# Patient Record
Sex: FEMALE | Race: WHITE | NOT HISPANIC OR LATINO | Employment: FULL TIME | URBAN - METROPOLITAN AREA
[De-identification: names, ages, dates, MRNs, and addresses within clinical notes are randomized per-mention and may not be internally consistent; named-entity substitution may affect disease eponyms.]

---

## 2017-04-12 ENCOUNTER — ALLIED HEALTH/NURSE VISIT (OUTPATIENT)
Dept: NURSING | Facility: CLINIC | Age: 47
End: 2017-04-12
Payer: COMMERCIAL

## 2017-04-12 ENCOUNTER — OFFICE VISIT (OUTPATIENT)
Dept: OBGYN | Facility: CLINIC | Age: 47
End: 2017-04-12
Payer: COMMERCIAL

## 2017-04-12 VITALS
HEIGHT: 64 IN | BODY MASS INDEX: 29.02 KG/M2 | SYSTOLIC BLOOD PRESSURE: 113 MMHG | TEMPERATURE: 98.3 F | DIASTOLIC BLOOD PRESSURE: 73 MMHG | HEART RATE: 86 BPM | WEIGHT: 170 LBS

## 2017-04-12 DIAGNOSIS — Z01.411 ENCOUNTER FOR GYNECOLOGICAL EXAMINATION WITH ABNORMAL FINDING: Primary | ICD-10-CM

## 2017-04-12 DIAGNOSIS — Z23 NEED FOR HEPATITIS B VACCINATION: Primary | ICD-10-CM

## 2017-04-12 DIAGNOSIS — R51.9 SINUS HEADACHE: ICD-10-CM

## 2017-04-12 DIAGNOSIS — Z23 NEED FOR HEPATITIS B VACCINATION: ICD-10-CM

## 2017-04-12 DIAGNOSIS — Z01.419 WELL WOMAN EXAM WITH ROUTINE GYNECOLOGICAL EXAM: ICD-10-CM

## 2017-04-12 PROCEDURE — 90746 HEPB VACCINE 3 DOSE ADULT IM: CPT | Performed by: OBSTETRICS & GYNECOLOGY

## 2017-04-12 PROCEDURE — 99213 OFFICE O/P EST LOW 20 MIN: CPT | Mod: 25 | Performed by: OBSTETRICS & GYNECOLOGY

## 2017-04-12 PROCEDURE — 99207 ZZC NO CHARGE NURSE ONLY: CPT

## 2017-04-12 PROCEDURE — 80061 LIPID PANEL: CPT | Performed by: OBSTETRICS & GYNECOLOGY

## 2017-04-12 PROCEDURE — 36415 COLL VENOUS BLD VENIPUNCTURE: CPT | Performed by: OBSTETRICS & GYNECOLOGY

## 2017-04-12 PROCEDURE — 90471 IMMUNIZATION ADMIN: CPT | Performed by: OBSTETRICS & GYNECOLOGY

## 2017-04-12 PROCEDURE — 99396 PREV VISIT EST AGE 40-64: CPT | Mod: 25 | Performed by: OBSTETRICS & GYNECOLOGY

## 2017-04-12 RX ORDER — LEVONORGESTREL AND ETHINYL ESTRADIOL 0.15-0.03
1 KIT ORAL DAILY
Qty: 84 TABLET | Refills: 3 | Status: SHIPPED | OUTPATIENT
Start: 2017-04-12 | End: 2018-02-07

## 2017-04-12 RX ORDER — AMOXICILLIN 875 MG
875 TABLET ORAL 2 TIMES DAILY
Qty: 20 TABLET | Refills: 0 | Status: SHIPPED | OUTPATIENT
Start: 2017-04-12 | End: 2019-02-18 | Stop reason: ALTCHOICE

## 2017-04-12 NOTE — NURSING NOTE
"Chief Complaint   Patient presents with     Physical       Initial /73  Pulse 86  Temp 98.3  F (36.8  C)  Ht 5' 4.25\" (1.632 m)  Wt 170 lb (77.1 kg)  LMP 02/15/2017  Breastfeeding? No  BMI 28.95 kg/m2 Estimated body mass index is 28.95 kg/(m^2) as calculated from the following:    Height as of this encounter: 5' 4.25\" (1.632 m).    Weight as of this encounter: 170 lb (77.1 kg).  BP completed using cuff size: regular        The following HM Due: NONE      The following patient reported/Care Every where data was sent to:  P ABSTRACT QUALITY INITIATIVES [60165]  none     n/a             "

## 2017-04-12 NOTE — MR AVS SNAPSHOT
"              After Visit Summary   4/12/2017    Morelia Stallworth    MRN: [id number]           Patient Information     Date Of Birth          1970        Visit Information        Provider Department      4/12/2017 1:00 PM Gloria Wynn MD [facility]        Today's Diagnoses     Encounter for gynecological examination with abnormal finding    -  1    Sinus headache        Well woman exam with routine gynecological exam        Need for hepatitis B vaccination           Follow-ups after your visit        Who to contact     If you have questions or need follow up information about today's clinic visit or your schedule please contact [facility] directly at [phone].  Normal or non-critical lab and imaging results will be communicated to you by MyChart, letter or phone within 4 business days after the clinic has received the results. If you do not hear from us within 7 days, please contact the clinic through MyChart or phone. If you have a critical or abnormal lab result, we will notify you by phone as soon as possible.  Submit refill requests through [employer] or call your pharmacy and they will forward the refill request to us. Please allow 3 business days for your refill to be completed.          Additional Information About Your Visit        MyChart Information     [employer] gives you secure access to your electronic health record. If you see a primary care provider, you can also send messages to your care team and make appointments. If you have questions, please call your primary care clinic.  If you do not have a primary care provider, please call [phone] and they will assist you.        Care EveryWhere ID     This is your Care EveryWhere ID. This could be used by other organizations to access your [facility] medical records  [id number]        Your Vitals Were     Pulse Temperature Height Last Period Breastfeeding? BMI (Body Mass Index)    86 98.3  F (36.8  C) 5' 4.25\" " (1.632 m) 02/15/2017 No 28.95 kg/m2       Blood Pressure from Last 3 Encounters:   04/12/17 113/73   01/20/16 100/62   11/19/14 100/60    Weight from Last 3 Encounters:   04/12/17 170 lb (77.1 kg)   01/20/16 168 lb 1.6 oz (76.2 kg)   11/19/14 165 lb (74.8 kg)              We Performed the Following     HEPATITIS B VACCINE, ADULT, IM     Lipid Profile with reflex to direct LDL     VACCINE ADMINISTRATION, INITIAL          Today's Medication Changes          These changes are accurate as of: 4/12/17  2:29 PM.  If you have any questions, ask your nurse or doctor.               Start taking these medicines.        Dose/Directions    amoxicillin 875 MG tablet   Commonly known as:  AMOXIL   Used for:  Sinus headache   Started by:  Gloria Wynn MD        Dose:  875 mg   Take 1 tablet (875 mg) by mouth 2 times daily   Quantity:  20 tablet   Refills:  0            Where to get your medicines      These medications were sent to [facility] - MALIK Bernal - [address]  [address], [place] [address]     Phone:  [phone]     levonorgestrel-ethinyl estradiol 0.15-30 MG-MCG per tablet         These medications were sent to [facility] [address] [place] [address] AT [facility] & [address] [place] [id number]    Hours:  24-hours Phone:  [phone]     amoxicillin 875 MG tablet                Primary Care Provider Office Phone # Fax #    Sánchez Jaswant Wells [phone]       [address] [address]        Thank you!     Thank you for choosing [facility]  for your care. Our goal is always to provide you with excellent care. Hearing back from our patients is one way we can continue to improve our services. Please take a few minutes to complete the written survey that you may receive in the mail after your visit with us. Thank you!             Your Updated  Medication List - Protect others around you: Learn how to safely use, store and throw away your medicines at www.disposemymeds.org.          This list is accurate as of: 4/12/17  2:29 PM.  Always use your most recent med list.                   Brand Name Dispense Instructions for use    amoxicillin 875 MG tablet    AMOXIL    20 tablet    Take 1 tablet (875 mg) by mouth 2 times daily       fexofenadine 180 MG tablet    ALLEGRA    90 tablet    take 1 tablet by mouth daily.       levonorgestrel-ethinyl estradiol 0.15-30 MG-MCG per tablet    NORDETTE    84 tablet    Take 1 tablet by mouth daily Pt will need an office visit for more refills. Thanks!

## 2017-04-12 NOTE — PROGRESS NOTES
"Morelia is a 46 year old  female who presents for annual exam.  She is doing well with current oral contraceptive pills, takes them continuously x 3 months.  No risk factors, wishes to continue pills.  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  Had mammogram in , will schedule another.  Normal pap 2016, will do q 3 year.  Sister told her she had elevated lipids, will send lipid panel today.  Needs last Hep B vaccination, will get today.  In addition to routine health maintenance, she also wishes to discuss a persistent headache.  15 minutes were spent in counseling regarding this issue (in addition to routine health maintenance.)   She describes a frontal and orbital \"raccoon eyes\" headache present for the past few weeks.  Advil/tylenol help, but the headache recurs.  No history of migraines.  No visual disturbances, no peripheral symptoms.  No nasal drainage, watery/itchy eyes.  Discussed with Dr. Wells (he sees her children, she has seen him in the past) who advises this is likely a sinus headache, and advises rx with amoxicillin as written.  If this is not helpful, she will RTC with Dr. Wells.        Menses are every 3 months and normal lasting 3 days.  Menses flow: normal and light.  Patient's last menstrual period was 02/15/2017.. Using oral contraceptives for contraception.  She is not currently considering pregnancy.  Besides routine health maintenance, she has no other health concerns today .  GYNECOLOGIC HISTORY:  Menarche: 15    Morelia is sexually active with 1male partner(s) and is currently in monogamous relationship with .    History sexually transmitted infections:No STD history  STI testing offered?  Declined  JAY JAY exposure: No  History of abnormal Pap smear: NO - age 30- 65 PAP every 3 years recommended  Family history of breast CA: No  Family history of uterine/ovarian CA: No    Family history of colon CA: No    HEALTH MAINTENANCE:  Cholesterol: (  Cholesterol "   Date Value Ref Range Status   2014 199 <200 mg/dL Final     Comment:     LDL Cholesterol is the primary guide to therapy.   The NCEP recommends further evaluation of: patients with cholesterol greater   than 200 mg/dL if additional risk factors are present, cholesterol greater   than   240 mg/dL, triglycerides greater than 150 mg/dL, or HDL less than 40 mg/dL.     2010 160 0 - 200 mg/dL Final     Comment:     LDL Cholesterol is the primary guide to therapy.   The NCEP recommends further evaluation of: patients with cholesterol <200   mg/dL   if additional risk factors are present, cholesterol >240 mg/dL, triglycerides   >150 mg/dL, or HDL <40 mg/dL.    History of abnormal lipids: slightly high  Mammo:  . History of abnormal Mammo: No.  Regular Self Breast Exams: Yes  Calcium/Vitamin D intake: source:  dairy, dietary supplement(s) Adequate? Yes  TSH: (  TSH   Date Value Ref Range Status   2013 5.89 (H) 0.4 - 5.0 mU/L Final    )  Pap; (  Lab Results   Component Value Date    PAP NIL 2016    PAP NIL 2013    PAP NIL 2011    )    HISTORY:  Obstetric History       T2      TAB0   SAB0   E0   M0   L2       # Outcome Date GA Lbr Hubert/2nd Weight Sex Delivery Anes PTL Lv   2 Term 05 40w0d  9 lb 10 oz (4.366 kg) F CS   Y      Name: Xena   1 Term 03 40w0d  9 lb (4.082 kg) F CS   Y      Name: chris        Past Medical History:   Diagnosis Date     Endometriosis, site unspecified     lap      Past Surgical History:   Procedure Laterality Date     C  DELIVERY ONLY  3/4/03     C  DELIVERY ONLY  05     LAPAROSCOPY PROCEDURE UNLISTED  02    endometriosis     Family History   Problem Relation Age of Onset     CANCER Maternal Grandfather      pancreatic     Lipids Mother      Lipids Father      Thyroid Disease Mother      hypo     Neurologic Disorder Father      frequent HAs     Depression Mother      Depression Maternal Aunt       Depression Other      maternal cousins     Breast Cancer Maternal Aunt      Social History     Social History     Marital status:      Spouse name: N/A     Number of children: N/A     Years of education: N/A     Social History Main Topics     Smoking status: Never Smoker     Smokeless tobacco: Never Used     Alcohol use 0.6 oz/week     1 Standard drinks or equivalent per week      Comment: occ     Drug use: No     Sexual activity: Yes     Partners: Male     Birth control/ protection: Pill     Other Topics Concern      Service No     Blood Transfusions No     Caffeine Concern No     Occupational Exposure No     Hobby Hazards No     Sleep Concern No     Stress Concern No     Weight Concern No     Special Diet No     Back Care No     Exercise Yes     walks daily     Bike Helmet Yes     Seat Belt Yes     Self-Exams No     Social History Narrative    Caffeine intake/servings daily - 2-3    Calcium intake/servings daily - 2    Exercise 7 times weekly - describe jogging dance    Sunscreen used - Yes    Seatbelts used - Yes    Guns stored in the home - No    Self Breast Exam - No    Pap test up to date -  Yes    Eye exam up to date -  Yes    Dental exam up to date -  Yes    DEXA scan up to date -  Not Applicable    Flex Sig/Colonoscopy up to date -  Not Applicable    Mammography up to date -  Not Applicable    Immunizations reviewed and up to date - Yes    Abuse: Current or Past (Physical, Sexual or Emotional) - No    Do you feel safe in your environment - Yes    Do you cope well with stress - Yes    Do you suffer from insomnia - occ    Last updated by: Shefali Faulkner MA September 30, 2011                                           Current Outpatient Prescriptions:      levonorgestrel-ethinyl estradiol (NORDETTE) 0.15-30 MG-MCG per tablet, Take 1 tablet by mouth daily Pt will need an office visit for more refills. Thanks!, Disp: 84 tablet, Rfl: 0     fexofenadine (ALLEGRA) 180 MG tablet, take 1 tablet by mouth  "daily., Disp: 90 tablet, Rfl: 3     Allergies   Allergen Reactions     No Known Drug Allergies        Past medical, surgical, social and family history were reviewed and updated in EPIC.    ROS:   C:     NEGATIVE for fever, chills, change in weight  I:       NEGATIVE for worrisome rashes, moles or lesions  E:     NEGATIVE for vision changes or irritation  E/M: NEGATIVE for ear, mouth and throat problems  R:     NEGATIVE for significant cough or SOB  CV:   NEGATIVE for chest pain, palpitations or peripheral edema  GI:     NEGATIVE for nausea, abdominal pain, heartburn, or change in bowel habits  :   NEGATIVE for frequency, dysuria, hematuria, vaginal discharge, or irregular bleeding  M:     NEGATIVE for significant arthralgias or myalgia  N:      NEGATIVE for weakness, dizziness or paresthesias  E:      NEGATIVE for temperature intolerance, skin/hair changes  P:      NEGATIVE for changes in mood or affect.    EXAM:  /73  Pulse 86  Temp 98.3  F (36.8  C)  Ht 5' 4.25\" (1.632 m)  Wt 170 lb (77.1 kg)  LMP 02/15/2017  Breastfeeding? No  BMI 28.95 kg/m2   BMI: Body mass index is 28.95 kg/(m^2).  Constitutional: healthy, alert and no distress  Head: Normocephalic. No masses, lesions, tenderness or abnormalities  Neck: Neck supple. Trachea midline. No adenopathy. Thyroid symmetric, normal size.   Cardiovascular: RRR.   Respiratory: Negative.   Breast: No nodularity, asymmetry or nipple discharge bilaterally.  Gastrointestinal: Abdomen soft, non-tender, non-distended. No masses, organomegaly.  :  Vulva:  No external lesions, normal female hair distribution, no inguinal adenopathy.    Urethra:  Midline, non-tender, well supported, no discharge  Vagina:  Moist, pink, no abnormal discharge, no lesions  Uterus:  Normal size, non-tender, freely mobile  Ovaries:  No masses appreciated, non-tender, mobile  Rectal Exam: deferred  Musculoskeletal: extremities normal  Skin: no suspicious lesions or rashes  Psychiatric: " Affect appropriate, cooperative,mentation appears normal.     COUNSELING:      reports that she has never smoked. She has never used smokeless tobacco.    Body mass index is 28.95 kg/(m^2).  Weight management plan: diet and exercise  FRAX Risk Assessment    ASSESSMENT:  46 year old female with satisfactory annual exam  (Z01.411) Encounter for gynecological examination with abnormal finding  (primary encounter diagnosis)  Comment:   Plan: Lipid Profile with reflex to direct LDL            (R51) Sinus headache  Comment:   Plan: amoxicillin (AMOXIL) 875 MG tablet            (Z01.419) Well woman exam with routine gynecological exam  Comment:   Plan: levonorgestrel-ethinyl estradiol (NORDETTE)         0.15-30 MG-MCG per tablet            (Z23) Need for hepatitis B vaccination  Comment:   Plan: HEPATITIS B VACCINE, ADULT, IM, VACCINE         ADMINISTRATION, INITIAL

## 2017-04-13 LAB
CHOLEST SERPL-MCNC: 213 MG/DL
HDLC SERPL-MCNC: 59 MG/DL
LDLC SERPL CALC-MCNC: 128 MG/DL
NONHDLC SERPL-MCNC: 154 MG/DL
TRIGL SERPL-MCNC: 128 MG/DL

## 2017-09-13 ENCOUNTER — ALLIED HEALTH/NURSE VISIT (OUTPATIENT)
Dept: NURSING | Facility: CLINIC | Age: 47
End: 2017-09-13
Payer: COMMERCIAL

## 2017-09-13 DIAGNOSIS — Z23 NEED FOR PROPHYLACTIC VACCINATION AND INOCULATION AGAINST INFLUENZA: Primary | ICD-10-CM

## 2017-09-13 DIAGNOSIS — Z23 NEED FOR TUBERCULOSIS VACCINATION: ICD-10-CM

## 2017-09-13 PROCEDURE — 90471 IMMUNIZATION ADMIN: CPT

## 2017-09-13 PROCEDURE — 99207 ZZC NO CHARGE NURSE ONLY: CPT

## 2017-09-13 PROCEDURE — 90686 IIV4 VACC NO PRSV 0.5 ML IM: CPT

## 2017-09-13 PROCEDURE — 86580 TB INTRADERMAL TEST: CPT

## 2017-09-13 NOTE — MR AVS SNAPSHOT
After Visit Summary   9/13/2017    Morelia Stallworth    MRN: [id number]           Patient Information     Date Of Birth          1970        Visit Information        Provider Department      9/13/2017 9:00 AM HW MEDICAL ASSISTANT [facility]        Today's Diagnoses     Need for prophylactic vaccination and inoculation against influenza    -  1    Need for tuberculosis vaccination           Follow-ups after your visit        Your next 10 appointments already scheduled     Sep 15, 2017  9:30 AM CDT   Nurse Only with HW RN/TRIAGE NURSE ONLY   [facility] ([facility])    [address] [place] [id number] [phone]              Who to contact     If you have questions or need follow up information about today's clinic visit or your schedule please contact [facility] directly at [phone].  Normal or non-critical lab and imaging results will be communicated to you by [employer], letter or phone within 4 business days after the clinic has received the results. If you do not hear from us within 7 days, please contact the clinic through [employer] or phone. If you have a critical or abnormal lab result, we will notify you by phone as soon as possible.  Submit refill requests through [employer] or call your pharmacy and they will forward the refill request to us. Please allow 3 business days for your refill to be completed.          Additional Information About Your Visit        MyChart Information     [employer] gives you secure access to your electronic health record. If you see a primary care provider, you can also send messages to your care team and make appointments. If you have questions, please call your primary care clinic.  If you do not have a primary care provider, please call [phone] and they will assist you.        Care EveryWhere ID     This is your Care EveryWhere ID. This could be used by other organizations  to access your [facility] medical records  [id number]         Blood Pressure from Last 3 Encounters:   04/12/17 113/73   01/20/16 100/62   11/19/14 100/60    Weight from Last 3 Encounters:   04/12/17 170 lb (77.1 kg)   01/20/16 168 lb 1.6 oz (76.2 kg)   11/19/14 165 lb (74.8 kg)              We Performed the Following     EA ADD'L VACCINE     FLU VAC, SPLIT VIRUS IM > 3 YO (QUADRIVALENT) [70097]     TB INTRADERMAL TEST     Vaccine Administration, Initial [38455]        Primary Care Provider Office Phone # Fax #    Sánchez Jaswant Wells [phone] [address] [place], [place] [id number]        Equal Access to Services     YAEL DUMONT : Hadii aad ku hadasho Soomaali, waaxda luqadaha, qaybta kaalmada adeegyada, quique crane . So [facility] [phone].    ATENCIÓN: Si habla español, tiene a nuñez disposición servicios gratuitos de asistencia lingüística. Llame al [phone].    We comply with applicable federal civil rights laws and [place] laws. We do not discriminate on the basis of race, color, national origin, age, disability sex, sexual orientation or gender identity.            Thank you!     Thank you for choosing [facility]  for your care. Our goal is always to provide you with excellent care. Hearing back from our patients is one way we can continue to improve our services. Please take a few minutes to complete the written survey that you may receive in the mail after your visit with us. Thank you!             Your Updated Medication List - Protect others around you: Learn how to safely use, store and throw away your medicines at www.disposemymeds.org.          This list is accurate as of: 9/13/17  9:46 AM.  Always use your most recent med list.                   Brand Name Dispense Instructions for use Diagnosis    amoxicillin 875 MG tablet    AMOXIL    20 tablet    Take 1 tablet (875 mg) by mouth 2 times daily    Sinus headache        fexofenadine 180 MG tablet    ALLEGRA    90 tablet    take 1 tablet by mouth daily.    Allergies       levonorgestrel-ethinyl estradiol 0.15-30 MG-MCG per tablet    NORDETTE    84 tablet    Take 1 tablet by mouth daily Pt will need an office visit for more refills. Thanks!    Well woman exam with routine gynecological exam

## 2017-09-13 NOTE — PROGRESS NOTES
Injectable Influenza Immunization Documentation    1.  Are you sick today? (Fever of 100.5 or higher on the day of the clinic)   No    2.  Have you ever had Guillain-Vanzant Syndrome within 6 weeks of an influenza vaccionation?  No    3. Do you have a life-threatening allergy to eggs?  No    4. Do you have a life-threatening allergy to a component of the vaccine? May include antibiotics, gelatin or latex.  No     5. Have you ever had a reaction to a dose of flu vaccine that needed immediate medical attention?  No     Form completed by Natacha Geller MA

## 2017-09-13 NOTE — NURSING NOTE
The patient is asked the following questions today and these are her answers:    -Have you had a mantoux administered in the past 30 days?    No  -Have you had a previous positive Mantoux.  No  -Have you received BCG in the past.  No  -Have you had a live vaccine  (MMR, Varicella, OPV, Yellow Fever) in the last 6 weeks.  No  -Have you had and active  viral or bacterial infection in the past 6 weeks.  No  -Have you received corticosteroids or immunosuppressive agents in the past 6 weeks.  No  -Have you been diagnosed with HIV?  No  -Do you have a maglinancy?  No     Natacha Geller MA

## 2017-09-13 NOTE — NURSING NOTE
"Chief Complaint   Patient presents with     Allied Health Visit       Initial There were no vitals taken for this visit. Estimated body mass index is 28.95 kg/(m^2) as calculated from the following:    Height as of 4/12/17: 5' 4.25\" (1.632 m).    Weight as of 4/12/17: 170 lb (77.1 kg).  Medication Reconciliation: unable or not appropriate to perform     Natacha Geller MA    - Advise to come back with 448-72 hours for mantoux reading    "

## 2017-09-15 ENCOUNTER — ALLIED HEALTH/NURSE VISIT (OUTPATIENT)
Dept: NURSING | Facility: CLINIC | Age: 47
End: 2017-09-15
Payer: COMMERCIAL

## 2017-09-15 DIAGNOSIS — Z11.1 SCREENING EXAMINATION FOR PULMONARY TUBERCULOSIS: Primary | ICD-10-CM

## 2017-09-15 LAB
PPDINDURATION: 0 MM (ref 0–5)
PPDREDNESS: 0 MM

## 2017-09-15 PROCEDURE — 99207 ZZC NO CHARGE NURSE ONLY: CPT

## 2018-02-09 ENCOUNTER — RADIANT APPOINTMENT (OUTPATIENT)
Dept: MAMMOGRAPHY | Facility: CLINIC | Age: 48
End: 2018-02-09
Attending: FAMILY MEDICINE

## 2018-02-09 DIAGNOSIS — Z12.31 VISIT FOR SCREENING MAMMOGRAM: ICD-10-CM

## 2018-04-18 ENCOUNTER — OFFICE VISIT (OUTPATIENT)
Dept: OBGYN | Facility: CLINIC | Age: 48
End: 2018-04-18
Payer: COMMERCIAL

## 2018-04-18 VITALS
SYSTOLIC BLOOD PRESSURE: 100 MMHG | DIASTOLIC BLOOD PRESSURE: 66 MMHG | HEIGHT: 64 IN | WEIGHT: 175.5 LBS | HEART RATE: 80 BPM | BODY MASS INDEX: 29.96 KG/M2

## 2018-04-18 DIAGNOSIS — E78.00 ELEVATED CHOLESTEROL: ICD-10-CM

## 2018-04-18 DIAGNOSIS — Z01.419 WELL WOMAN EXAM WITH ROUTINE GYNECOLOGICAL EXAM: ICD-10-CM

## 2018-04-18 DIAGNOSIS — Z01.419 ENCOUNTER FOR GYNECOLOGICAL EXAMINATION WITHOUT ABNORMAL FINDING: Primary | ICD-10-CM

## 2018-04-18 PROCEDURE — 80061 LIPID PANEL: CPT | Performed by: OBSTETRICS & GYNECOLOGY

## 2018-04-18 PROCEDURE — 36415 COLL VENOUS BLD VENIPUNCTURE: CPT | Performed by: OBSTETRICS & GYNECOLOGY

## 2018-04-18 PROCEDURE — 99396 PREV VISIT EST AGE 40-64: CPT | Performed by: OBSTETRICS & GYNECOLOGY

## 2018-04-18 RX ORDER — LEVONORGESTREL AND ETHINYL ESTRADIOL 0.15-0.03
1 KIT ORAL DAILY
Qty: 84 TABLET | Refills: 4 | Status: SHIPPED | OUTPATIENT
Start: 2018-04-18 | End: 2018-12-03

## 2018-04-18 NOTE — NURSING NOTE
"Chief Complaint   Patient presents with     Physical       Initial /66  Ht 5' 4.25\" (1.632 m)  Wt 175 lb 8 oz (79.6 kg)  LMP 2018  Breastfeeding? No  BMI 29.89 kg/m2 Estimated body mass index is 29.89 kg/(m^2) as calculated from the following:    Height as of this encounter: 5' 4.25\" (1.632 m).    Weight as of this encounter: 175 lb 8 oz (79.6 kg).  BP completed using cuff size: large        The following HM Due: NONE      The following patient reported/Care Every where data was sent to:  P ABSTRACT QUALITY INITIATIVES [89576]  none      n/a              "

## 2018-04-18 NOTE — MR AVS SNAPSHOT
"              After Visit Summary   4/18/2018    Morelia Stallworth    MRN: [id number]           Patient Information     Date Of Birth          1970        Visit Information        Provider Department      4/18/2018 9:30 AM Gloria Wynn MD [facility]        Today's Diagnoses     Encounter for gynecological examination without abnormal finding    -  1    Well woman exam with routine gynecological exam        Elevated cholesterol           Follow-ups after your visit        Who to contact     If you have questions or need follow up information about today's clinic visit or your schedule please contact [facility] directly at [phone].  Normal or non-critical lab and imaging results will be communicated to you by MyChart, letter or phone within 4 business days after the clinic has received the results. If you do not hear from us within 7 days, please contact the clinic through Mx Orthopedicshart or phone. If you have a critical or abnormal lab result, we will notify you by phone as soon as possible.  Submit refill requests through Lootsie or call your pharmacy and they will forward the refill request to us. Please allow 3 business days for your refill to be completed.          Additional Information About Your Visit        MyChart Information     Lootsie gives you secure access to your electronic health record. If you see a primary care provider, you can also send messages to your care team and make appointments. If you have questions, please call your primary care clinic.  If you do not have a primary care provider, please call [phone] and they will assist you.        Care EveryWhere ID     This is your Care EveryWhere ID. This could be used by other organizations to access your [place] medical records  [id number]        Your Vitals Were     Pulse Height Last Period Breastfeeding? BMI (Body Mass Index)       80 5' 4.25\" (1.632 m) 03/05/2018 No 29.89 kg/m2        Blood Pressure " from Last 3 Encounters:   04/18/18 100/66   04/12/17 113/73   01/20/16 100/62    Weight from Last 3 Encounters:   04/18/18 175 lb 8 oz (79.6 kg)   04/12/17 170 lb (77.1 kg)   01/20/16 168 lb 1.6 oz (76.2 kg)              We Performed the Following     Lipid Profile (Chol, Trig, HDL, LDL calc)          Today's Medication Changes          These changes are accurate as of 4/18/18  9:57 AM.  If you have any questions, ask your nurse or doctor.               These medicines have changed or have updated prescriptions.        Dose/Directions    levonorgestrel-ethinyl estradiol 0.15-30 MG-MCG per tablet   Commonly known as:  GUILLE   This may have changed:  additional instructions   Used for:  Well woman exam with routine gynecological exam   Changed by:  Gloria Wynn MD        Dose:  1 tablet   Take 1 tablet by mouth daily Take hormone pills continuously x 3 months thn off one week   Quantity:  84 tablet   Refills:  4            Where to get your medicines      These medications were sent to [facility] - [place], [place] - [address]  [address], [place] [address]     Phone:  [phone]     levonorgestrel-ethinyl estradiol 0.15-30 MG-MCG per tablet                Primary Care Provider Office Phone # Fax #    Sánchez Jaswant Wells [phone] [address] [place] [address]        Equal Access to Services     GUSTAVO DUMONT AH: Hadii amelia gonzalezo Sobennett, waaxda luqadaha, qaybta kaalmada adeegyasolitario, waxay marielos crane . So [facility] [phone].    ATENCIÓN: Si habla español, tiene a nuñez disposición servicios gratuitos de asistencia lingüística. Llame al [phone].    We comply with applicable federal civil rights laws and [place] laws. We do not discriminate on the basis of race, color, national origin, age, disability, sex, sexual orientation, or gender identity.            Thank you!     Thank you for choosing [facility]  for your care. Our goal is always to provide you with excellent care. Hearing back from our patients is one way we can continue to improve our services. Please take a few minutes to complete the written survey that you may receive in the mail after your visit with us. Thank you!             Your Updated Medication List - Protect others around you: Learn how to safely use, store and throw away your medicines at www.disposemymeds.org.          This list is accurate as of 4/18/18  9:57 AM.  Always use your most recent med list.                   Brand Name Dispense Instructions for use Diagnosis    amoxicillin 875 MG tablet    AMOXIL    20 tablet    Take 1 tablet (875 mg) by mouth 2 times daily    Sinus headache       fexofenadine 180 MG tablet    ALLEGRA    90 tablet    take 1 tablet by mouth daily.    Allergies       levonorgestrel-ethinyl estradiol 0.15-30 MG-MCG per tablet    NORDETTE    84 tablet    Take 1 tablet by mouth daily Take hormone pills continuously x 3 months thn off one week    Well woman exam with routine gynecological exam

## 2018-04-18 NOTE — PROGRESS NOTES
Morelia is a 47 year old  female who presents for annual exam.   She is doing well with oral contraceptive pills, takes them continuously, wishes to continue.  No risk factors.  Cholesterol has been borderline elevated, has FH, will send lipid panel today.  She will continue to try to reduce fat in diet, increase exercise.  Mammogram 2018, plans q 2 year for now, discussed.  Pap due next year.  Dr. Wells is primary, sees him if problems arise.     Menses are regular q 12 weeks and normal lasting 4 days.  Menses flow: normal and light.  Patient's last menstrual period was 2018.. Using oral contraceptives for contraception.  She is not currently considering pregnancy.  Besides routine health maintenance, she has no other health concerns today .  GYNECOLOGIC HISTORY:  Menarche: 0    Morelia is sexually active with 1  male partner(s) and is currently in monogamous relationship with .    History sexually transmitted infections:No STD history  STI testing offered?  Declined  JAY JAY exposure: No  History of abnormal Pap smear: NO - age 30- 65 PAP every 3 years recommended  Family history of breast CA: No  Family history of uterine/ovarian CA: No    Family history of colon CA: No    HEALTH MAINTENANCE:  Cholesterol: (  Cholesterol   Date Value Ref Range Status   2017 213 (H) <200 mg/dL Final     Comment:     Desirable:       <200 mg/dl   2014 199 <200 mg/dL Final     Comment:     LDL Cholesterol is the primary guide to therapy.   The NCEP recommends further evaluation of: patients with cholesterol greater   than 200 mg/dL if additional risk factors are present, cholesterol greater   than   240 mg/dL, triglycerides greater than 150 mg/dL, or HDL less than 40 mg/dL.      History of abnormal lipids borderline  Mammo: 0 . History of abnormal Mammo: Not applicable.  Regular Self Breast Exams: Yes  Calcium/Vitamin D intake: source:  dairy, dietary supplement(s) Adequate? Yes  TSH: (  TSH   Date Value  Ref Range Status   2013 5.89 (H) 0.4 - 5.0 mU/L Final    )  Pap; (  Lab Results   Component Value Date    PAP NIL 2016    PAP NIL 2013    PAP NIL 2011    )    HISTORY:  Obstetric History       T2      L2     SAB0   TAB0   Ectopic0   Multiple0   Live Births2       # Outcome Date GA Lbr Hubert/2nd Weight Sex Delivery Anes PTL Lv   2 Term 05 40w0d  9 lb 10 oz (4.366 kg) F CS   LON      Name: Xena   1 Term 03 40w0d  9 lb (4.082 kg) F CS   LON      Name: chris        Past Medical History:   Diagnosis Date     Endometriosis, site unspecified     lap      Past Surgical History:   Procedure Laterality Date     C  DELIVERY ONLY  3/4/03     C  DELIVERY ONLY  05     LAPAROSCOPY PROCEDURE UNLISTED  02    endometriosis     Family History   Problem Relation Age of Onset     CANCER Maternal Grandfather      pancreatic     Lipids Mother      Lipids Father      Thyroid Disease Mother      hypo     Neurologic Disorder Father      frequent HAs     Depression Mother      Depression Maternal Aunt      Depression Other      maternal cousins     Breast Cancer Maternal Aunt      Social History     Social History     Marital status:      Spouse name: N/A     Number of children: N/A     Years of education: N/A     Social History Main Topics     Smoking status: Never Smoker     Smokeless tobacco: Never Used     Alcohol use 0.6 oz/week     1 Standard drinks or equivalent per week      Comment: occ     Drug use: No     Sexual activity: Yes     Partners: Male     Birth control/ protection: Pill     Other Topics Concern      Service No     Blood Transfusions No     Caffeine Concern No     Occupational Exposure No     Hobby Hazards No     Sleep Concern No     Stress Concern No     Weight Concern No     Special Diet No     Back Care No     Exercise Yes     walks daily     Bike Helmet Yes     Seat Belt Yes     Self-Exams No     Social History Narrative     Caffeine intake/servings daily - 2-3    Calcium intake/servings daily - 2    Exercise 7 times weekly - describe jogging dance    Sunscreen used - Yes    Seatbelts used - Yes    Guns stored in the home - No    Self Breast Exam - No    Pap test up to date -  Yes    Eye exam up to date -  Yes    Dental exam up to date -  Yes    DEXA scan up to date -  Not Applicable    Flex Sig/Colonoscopy up to date -  Not Applicable    Mammography up to date -  Not Applicable    Immunizations reviewed and up to date - Yes    Abuse: Current or Past (Physical, Sexual or Emotional) - No    Do you feel safe in your environment - Yes    Do you cope well with stress - Yes    Do you suffer from insomnia - occ    Last updated by: Shefali Faulkner MA September 30, 2011                                           Current Outpatient Prescriptions:      fexofenadine (ALLEGRA) 180 MG tablet, take 1 tablet by mouth daily., Disp: 90 tablet, Rfl: 3     levonorgestrel-ethinyl estradiol (NORDETTE) 0.15-30 MG-MCG per tablet, Take 1 tablet by mouth daily Pt will need an office visit for more refills. Thanks!, Disp: 84 tablet, Rfl: 1     amoxicillin (AMOXIL) 875 MG tablet, Take 1 tablet (875 mg) by mouth 2 times daily (Patient not taking: Reported on 4/18/2018), Disp: 20 tablet, Rfl: 0     Allergies   Allergen Reactions     No Known Drug Allergies        Past medical, surgical, social and family history were reviewed and updated in EPIC.    ROS:   C:     NEGATIVE for fever, chills, change in weight  I:       NEGATIVE for worrisome rashes, moles or lesions  E:     NEGATIVE for vision changes or irritation  E/M: NEGATIVE for ear, mouth and throat problems  R:     NEGATIVE for significant cough or SOB  CV:   NEGATIVE for chest pain, palpitations or peripheral edema  GI:     NEGATIVE for nausea, abdominal pain, heartburn, or change in bowel habits  :   NEGATIVE for frequency, dysuria, hematuria, vaginal discharge, or irregular bleeding  M:     NEGATIVE for  "significant arthralgias or myalgia  N:      NEGATIVE for weakness, dizziness or paresthesias  E:      NEGATIVE for temperature intolerance, skin/hair changes  P:      NEGATIVE for changes in mood or affect.    EXAM:  /66  Ht 5' 4.25\" (1.632 m)  Wt 175 lb 8 oz (79.6 kg)  LMP 03/05/2018  Breastfeeding? No  BMI 29.89 kg/m2   BMI: Body mass index is 29.89 kg/(m^2).  Constitutional: healthy, alert and no distress  Head: Normocephalic. No masses, lesions, tenderness or abnormalities  Neck: Neck supple. Trachea midline. No adenopathy. Thyroid symmetric, normal size.   Cardiovascular: RRR.   Respiratory: Negative.   Breast: No nodularity, asymmetry or nipple discharge bilaterally.  Gastrointestinal: Abdomen soft, non-tender, non-distended. No masses, organomegaly.  :  Vulva:  No external lesions, normal female hair distribution, no inguinal adenopathy.    Urethra:  Midline, non-tender, well supported, no discharge  Vagina:  Moist, pink, no abnormal discharge, no lesions  Uterus:  Normal size, non-tender, freely mobile  Ovaries:  No masses appreciated, non-tender, mobile  Rectal Exam: deferred  Musculoskeletal: extremities normal  Skin: no suspicious lesions or rashes  Psychiatric: Affect appropriate, cooperative,mentation appears normal.     COUNSELING:   Reviewed preventive health counseling, as reflected in patient instructions       Contraception   reports that she has never smoked. She has never used smokeless tobacco.    Body mass index is 29.89 kg/(m^2).  Weight management plan: diet and exercise  FRAX Risk Assessment    ASSESSMENT:  47 year old female with satisfactory annual exam  (Z01.419) Encounter for gynecological examination without abnormal finding  (primary encounter diagnosis)  Comment:   Plan:     (Z01.419) Well woman exam with routine gynecological exam  Comment:   Plan: levonorgestrel-ethinyl estradiol (NORDETTE)         0.15-30 MG-MCG per tablet            (E78.00) Elevated " cholesterol  Comment:   Plan: Lipid Profile (Chol, Trig, HDL, LDL calc)

## 2018-04-19 LAB
CHOLEST SERPL-MCNC: 213 MG/DL
HDLC SERPL-MCNC: 52 MG/DL
LDLC SERPL CALC-MCNC: 135 MG/DL
NONHDLC SERPL-MCNC: 161 MG/DL
TRIGL SERPL-MCNC: 130 MG/DL

## 2018-08-06 ENCOUNTER — ALLIED HEALTH/NURSE VISIT (OUTPATIENT)
Dept: NURSING | Facility: CLINIC | Age: 48
End: 2018-08-06
Payer: COMMERCIAL

## 2018-08-06 DIAGNOSIS — Z11.1 SCREENING EXAMINATION FOR PULMONARY TUBERCULOSIS: Primary | ICD-10-CM

## 2018-08-06 PROCEDURE — 99207 ZZC NO CHARGE NURSE ONLY: CPT

## 2018-08-06 PROCEDURE — 86580 TB INTRADERMAL TEST: CPT

## 2018-08-06 NOTE — MR AVS SNAPSHOT
After Visit Summary   8/6/2018    Morelia Stallworth    MRN: [id number]           Patient Information     Date Of Birth          1970        Visit Information        Provider Department      8/6/2018 3:00 PM HW MEDICAL ASSISTANT [facility]        Today's Diagnoses     Screening examination for pulmonary tuberculosis    -  1       Follow-ups after your visit        Your next 10 appointments already scheduled     Aug 08, 2018  4:00 PM CDT   Nurse Only with HW RN/TRIAGE NURSE ONLY   [facility] ([facility])    [address] [place] [id number] [phone]              Who to contact     If you have questions or need follow up information about today's clinic visit or your schedule please contact [facility] directly at [phone].  Normal or non-critical lab and imaging results will be communicated to you by babbelhart, letter or phone within 4 business days after the clinic has received the results. If you do not hear from us within 7 days, please contact the clinic through babbelhart or phone. If you have a critical or abnormal lab result, we will notify you by phone as soon as possible.  Submit refill requests through MentorDOTMe or call your pharmacy and they will forward the refill request to us. Please allow 3 business days for your refill to be completed.          Additional Information About Your Visit        MyChart Information     MentorDOTMe gives you secure access to your electronic health record. If you see a primary care provider, you can also send messages to your care team and make appointments. If you have questions, please call your primary care clinic.  If you do not have a primary care provider, please call [phone] and they will assist you.        Care EveryWhere ID     This is your Care EveryWhere ID. This could be used by other organizations to access your [place] medical records  [id number]          Blood Pressure from Last 3 Encounters:   04/18/18 100/66   04/12/17 113/73   01/20/16 100/62    Weight from Last 3 Encounters:   04/18/18 175 lb 8 oz (79.6 kg)   04/12/17 170 lb (77.1 kg)   01/20/16 168 lb 1.6 oz (76.2 kg)              We Performed the Following     ADMIN 1st VACCINE     TB INTRADERMAL TEST        Primary Care Provider Office Phone # Fax #    Sánchez Jaswant Wells [phone] [address] [place] [address]        Equal Access to Services     [facility]: Hadii amelia Knight, waronald garcia, azucena cabralesmasolitario el, quique crane . So [facility] [phone].    ATENCIÓN: Si habla español, tiene a nuñez disposición servicios gratuitos de asistencia lingüística. [facility] [phone].    We comply with applicable federal civil rights laws and [place] laws. We do not discriminate on the basis of race, color, national origin, age, disability, sex, sexual orientation, or gender identity.            Thank you!     Thank you for choosing [facility]  for your care. Our goal is always to provide you with excellent care. Hearing back from our patients is one way we can continue to improve our services. Please take a few minutes to complete the written survey that you may receive in the mail after your visit with us. Thank you!             Your Updated Medication List - Protect others around you: Learn how to safely use, store and throw away your medicines at www.disposemymeds.org.          This list is accurate as of 8/6/18  3:32 PM.  Always use your most recent med list.                   Brand Name Dispense Instructions for use Diagnosis    amoxicillin 875 MG tablet    AMOXIL    20 tablet    Take 1 tablet (875 mg) by mouth 2 times daily    Sinus headache       fexofenadine 180 MG tablet    ALLEGRA    90 tablet    take 1 tablet by mouth daily.    Allergies       levonorgestrel-ethinyl estradiol 0.15-30 MG-MCG per tablet     GUILLE    84 tablet    Take 1 tablet by mouth daily Take hormone pills continuously x 3 months thn off one week    Well woman exam with routine gynecological exam

## 2018-08-06 NOTE — NURSING NOTE
The patient is asked the following questions today and these are her answers:    -Have you had a mantoux administered in the past 30 days?    No  -Have you had a previous positive Mantoux.  No  -Have you received BCG in the past.  No  -Have you had a live vaccine  (MMR, Varicella, OPV, Yellow Fever) in the last 6 weeks.  No  -Have you had and active  viral or bacterial infection in the past 6 weeks.  No  -Have you received corticosteroids or immunosuppressive agents in the past 6 weeks.  No  -Have you been diagnosed with HIV?  No  -Do you have a maglinancy?  No     Hernando Mcmullen CMA

## 2018-08-08 ENCOUNTER — ALLIED HEALTH/NURSE VISIT (OUTPATIENT)
Dept: NURSING | Facility: CLINIC | Age: 48
End: 2018-08-08
Payer: COMMERCIAL

## 2018-08-08 DIAGNOSIS — Z11.1 SCREENING EXAMINATION FOR PULMONARY TUBERCULOSIS: Primary | ICD-10-CM

## 2018-08-08 LAB
PPDINDURATION: 0 MM (ref 0–5)
PPDREDNESS: 0 MM

## 2018-08-08 PROCEDURE — 99207 ZZC NO CHARGE NURSE ONLY: CPT

## 2018-08-08 NOTE — MR AVS SNAPSHOT
After Visit Summary   8/8/2018    Morelia Stallworth    MRN: [id number]           Patient Information     Date Of Birth          1970        Visit Information        Provider Department      8/8/2018 4:00 PM HW RN/TRIAGE NURSE ONLY [facility]        Today's Diagnoses     Screening examination for pulmonary tuberculosis    -  1       Follow-ups after your visit        Who to contact     If you have questions or need follow up information about today's clinic visit or your schedule please contact [facility] directly at [phone].  Normal or non-critical lab and imaging results will be communicated to you by Thubrikar Aortic Valvehart, letter or phone within 4 business days after the clinic has received the results. If you do not hear from us within 7 days, please contact the clinic through TextCornert or phone. If you have a critical or abnormal lab result, we will notify you by phone as soon as possible.  Submit refill requests through Ebury or call your pharmacy and they will forward the refill request to us. Please allow 3 business days for your refill to be completed.          Additional Information About Your Visit        MyChart Information     Ebury gives you secure access to your electronic health record. If you see a primary care provider, you can also send messages to your care team and make appointments. If you have questions, please call your primary care clinic.  If you do not have a primary care provider, please call [phone] and they will assist you.        Care EveryWhere ID     This is your Care EveryWhere ID. This could be used by other organizations to access your [place] medical records  [id number]         Blood Pressure from Last 3 Encounters:   04/18/18 100/66   04/12/17 113/73   01/20/16 100/62    Weight from Last 3 Encounters:   04/18/18 79.6 kg (175 lb 8 oz)   04/12/17 77.1 kg (170 lb)   01/20/16 76.2 kg (168 lb 1.6 oz)              Today, you had  the following     No orders found for display       Primary Care Provider Office Phone # Fax #    Sánchez Jaswant Wells [phone] [address] [place] [address]        Equal Access to Services     YAEL DUMONT : Hadcrystal amelia arriaza mabel Knight, waaxda luqadaha, qaybta kaalmada aderosa, quique koenig rosa maria stock. So [facility] [phone].    ATENCIÓN: Si habla español, tiene a nuñez disposición servicios gratuitos de asistencia lingüística. Llame al [phone].    We comply with applicable federal civil rights laws and [place] laws. We do not discriminate on the basis of race, color, national origin, age, disability, sex, sexual orientation, or gender identity.            Thank you!     Thank you for choosing [facility]  for your care. Our goal is always to provide you with excellent care. Hearing back from our patients is one way we can continue to improve our services. Please take a few minutes to complete the written survey that you may receive in the mail after your visit with us. Thank you!             Your Updated Medication List - Protect others around you: Learn how to safely use, store and throw away your medicines at www.disposemymeds.org.          This list is accurate as of 8/8/18  4:07 PM.  Always use your most recent med list.                   Brand Name Dispense Instructions for use Diagnosis    amoxicillin 875 MG tablet    AMOXIL    20 tablet    Take 1 tablet (875 mg) by mouth 2 times daily    Sinus headache       fexofenadine 180 MG tablet    ALLEGRA    90 tablet    take 1 tablet by mouth daily.    Allergies       levonorgestrel-ethinyl estradiol 0.15-30 MG-MCG per tablet    NORDETTE    84 tablet    Take 1 tablet by mouth daily Take hormone pills continuously x 3 months thn off one week    Well woman exam with routine gynecological exam

## 2018-08-08 NOTE — NURSING NOTE
Mantoux results:     No induration.  No swelling.  No redness.    Mantoux result:  Lab Results   Component Value Date    PPDREDNESS 0 08/08/2018    PPDINDURATIO 0 08/08/2018       Mantoux is negative.   Patient in agreement with plan and verbalizes understanding.     Raquel Ibrahim RN, BSN

## 2018-09-10 ENCOUNTER — ALLIED HEALTH/NURSE VISIT (OUTPATIENT)
Dept: NURSING | Facility: CLINIC | Age: 48
End: 2018-09-10
Payer: COMMERCIAL

## 2018-09-10 DIAGNOSIS — Z23 NEED FOR PROPHYLACTIC VACCINATION AND INOCULATION AGAINST INFLUENZA: Primary | ICD-10-CM

## 2018-09-10 PROCEDURE — 90471 IMMUNIZATION ADMIN: CPT

## 2018-09-10 PROCEDURE — 90686 IIV4 VACC NO PRSV 0.5 ML IM: CPT

## 2018-09-10 PROCEDURE — 99207 ZZC NO CHARGE NURSE ONLY: CPT

## 2018-09-10 NOTE — PROGRESS NOTES

## 2018-09-10 NOTE — MR AVS SNAPSHOT
After Visit Summary   9/10/2018    Morelia Stallworth    MRN: [id number]           Patient Information     Date Of Birth          1970        Visit Information        Provider Department      9/10/2018 10:00 AM HP MEDICAL ASSISTANT [facility]        Today's Diagnoses     Need for prophylactic vaccination and inoculation against influenza    -  1       Follow-ups after your visit        Who to contact     If you have questions or need follow up information about today's clinic visit or your schedule please contact [facility] directly at [phone].  Normal or non-critical lab and imaging results will be communicated to you by [employer], letter or phone within 4 business days after the clinic has received the results. If you do not hear from us within 7 days, please contact the clinic through [employer] or phone. If you have a critical or abnormal lab result, we will notify you by phone as soon as possible.  Submit refill requests through [employer] or call your pharmacy and they will forward the refill request to us. Please allow 3 business days for your refill to be completed.          Additional Information About Your Visit        MyChart Information     [employer] gives you secure access to your electronic health record. If you see a primary care provider, you can also send messages to your care team and make appointments. If you have questions, please call your primary care clinic.  If you do not have a primary care provider, please call [phone] and they will assist you.        Care EveryWhere ID     This is your Care EveryWhere ID. This could be used by other organizations to access your [facility] medical records  [id number]         Blood Pressure from Last 3 Encounters:   04/18/18 100/66   04/12/17 113/73   01/20/16 100/62    Weight from Last 3 Encounters:   04/18/18 175 lb 8 oz (79.6 kg)   04/12/17 170 lb (77.1 kg)   01/20/16 168 lb 1.6 oz (76.2 kg)               We Performed the Following     FLU VACCINE, SPLIT VIRUS, IM (QUADRIVALENT) [80624]- >3 YRS     Vaccine Administration, Initial [[id number]]        Primary Care Provider Office Phone # Fax #    Sánchez Jaswant Wells [phone] [address] [place] [address]        Equal Access to Services     [facility] JULIA : Hadii aad ku hadasho Soomaali, waaxda luqadaha, qaybta kaalmada adeshayneyada, quique arceo haytej buicindyjay crane . So [facility] [phone].    ATENCIÓN: Si habla español, tiene a nuñez disposición servicios gratuitos de asistencia lingüística. Gregorio al [phone].    We comply with applicable federal civil rights laws and [place] laws. We do not discriminate on the basis of race, color, national origin, age, disability, sex, sexual orientation, or gender identity.            Thank you!     Thank you for choosing [facility]  for your care. Our goal is always to provide you with excellent care. Hearing back from our patients is one way we can continue to improve our services. Please take a few minutes to complete the written survey that you may receive in the mail after your visit with us. Thank you!             Your Updated Medication List - Protect others around you: Learn how to safely use, store and throw away your medicines at www.disposemymeds.org.          This list is accurate as of 9/10/18 10:23 AM.  Always use your most recent med list.                   Brand Name Dispense Instructions for use Diagnosis    amoxicillin 875 MG tablet    AMOXIL    20 tablet    Take 1 tablet (875 mg) by mouth 2 times daily    Sinus headache       fexofenadine 180 MG tablet    ALLEGRA    90 tablet    take 1 tablet by mouth daily.    Allergies       levonorgestrel-ethinyl estradiol 0.15-30 MG-MCG per tablet    NORDETTE    84 tablet    Take 1 tablet by mouth daily Take hormone pills continuously x 3 months thn off one week    Well woman exam with routine  gynecological exam

## 2018-12-03 DIAGNOSIS — Z01.419 WELL WOMAN EXAM WITH ROUTINE GYNECOLOGICAL EXAM: ICD-10-CM

## 2018-12-03 RX ORDER — LEVONORGESTREL AND ETHINYL ESTRADIOL 0.15-0.03
1 KIT ORAL DAILY
Qty: 84 TABLET | Refills: 0 | Status: SHIPPED | OUTPATIENT
Start: 2018-12-03 | End: 2019-10-21

## 2018-12-03 NOTE — TELEPHONE ENCOUNTER
Pending Prescriptions:                       Disp   Refills    levonorgestrel-ethinyl estradiol (NORDETT*84 tab*0            Sig: Take 1 tablet by mouth daily Take hormone pills           continuously x 3 months thn off one week      Last OV was 18. Received fax from pharmacy indicating prescription for Radha Tab 28 has . Refill sent to pharmacy.   Leigh Johnson RN-BSN

## 2018-12-13 ENCOUNTER — TRANSFERRED RECORDS (OUTPATIENT)
Dept: HEALTH INFORMATION MANAGEMENT | Facility: CLINIC | Age: 48
End: 2018-12-13

## 2019-02-18 ENCOUNTER — OFFICE VISIT (OUTPATIENT)
Dept: FAMILY MEDICINE | Facility: CLINIC | Age: 49
End: 2019-02-18
Payer: COMMERCIAL

## 2019-02-18 VITALS
DIASTOLIC BLOOD PRESSURE: 68 MMHG | TEMPERATURE: 97.7 F | BODY MASS INDEX: 30.13 KG/M2 | SYSTOLIC BLOOD PRESSURE: 116 MMHG | OXYGEN SATURATION: 100 % | WEIGHT: 176.5 LBS | HEIGHT: 64 IN | HEART RATE: 83 BPM

## 2019-02-18 DIAGNOSIS — N30.90 CYSTITIS: ICD-10-CM

## 2019-02-18 DIAGNOSIS — R39.9 UTI SYMPTOMS: Primary | ICD-10-CM

## 2019-02-18 DIAGNOSIS — R82.90 NONSPECIFIC FINDING ON EXAMINATION OF URINE: ICD-10-CM

## 2019-02-18 DIAGNOSIS — N89.8 VAGINAL ITCHING: ICD-10-CM

## 2019-02-18 LAB
ALBUMIN UR-MCNC: NEGATIVE MG/DL
APPEARANCE UR: CLEAR
BACTERIA #/AREA URNS HPF: ABNORMAL /HPF
BILIRUB UR QL STRIP: NEGATIVE
COLOR UR AUTO: YELLOW
GLUCOSE UR STRIP-MCNC: NEGATIVE MG/DL
HGB UR QL STRIP: NEGATIVE
KETONES UR STRIP-MCNC: NEGATIVE MG/DL
LEUKOCYTE ESTERASE UR QL STRIP: ABNORMAL
Lab: NORMAL
NITRATE UR QL: NEGATIVE
NON-SQ EPI CELLS #/AREA URNS LPF: ABNORMAL /LPF
PH UR STRIP: 7 PH (ref 5–7)
RBC #/AREA URNS AUTO: ABNORMAL /HPF
SOURCE: ABNORMAL
SP GR UR STRIP: 1.01 (ref 1–1.03)
SPECIMEN SOURCE: NORMAL
UROBILINOGEN UR STRIP-ACNC: 0.2 EU/DL (ref 0.2–1)
WBC #/AREA URNS AUTO: ABNORMAL /HPF
WET PREP SPEC: NORMAL

## 2019-02-18 PROCEDURE — 81001 URINALYSIS AUTO W/SCOPE: CPT | Performed by: FAMILY MEDICINE

## 2019-02-18 PROCEDURE — 99213 OFFICE O/P EST LOW 20 MIN: CPT | Performed by: FAMILY MEDICINE

## 2019-02-18 PROCEDURE — 87086 URINE CULTURE/COLONY COUNT: CPT | Performed by: FAMILY MEDICINE

## 2019-02-18 PROCEDURE — 87186 SC STD MICRODIL/AGAR DIL: CPT | Performed by: FAMILY MEDICINE

## 2019-02-18 PROCEDURE — 87088 URINE BACTERIA CULTURE: CPT | Performed by: FAMILY MEDICINE

## 2019-02-18 PROCEDURE — 87210 SMEAR WET MOUNT SALINE/INK: CPT | Performed by: FAMILY MEDICINE

## 2019-02-18 RX ORDER — CIPROFLOXACIN 250 MG/1
250 TABLET, FILM COATED ORAL 2 TIMES DAILY
Qty: 6 TABLET | Refills: 0 | Status: SHIPPED | OUTPATIENT
Start: 2019-02-18 | End: 2019-02-18

## 2019-02-18 RX ORDER — CIPROFLOXACIN 250 MG/1
250 TABLET, FILM COATED ORAL 2 TIMES DAILY
Qty: 6 TABLET | Refills: 0 | Status: SHIPPED | OUTPATIENT
Start: 2019-02-18 | End: 2020-12-09

## 2019-02-18 ASSESSMENT — MIFFLIN-ST. JEOR: SCORE: 1425.6

## 2019-02-18 NOTE — PROGRESS NOTES
"  SUBJECTIVE:   Morelia Stallworth is a 48 year old female who presents to clinic today for the following health issues:      URINARY TRACT SYMPTOMS  Onset: 3 weeks she has been experiencing cloudy urine, foul odor    Description:   Painful urination (Dysuria): yes, mild dysuria   Blood in urine (Hematuria): no   Delay in urine (Hesitency): no     Intensity: mild    Progression of Symptoms:  same    Accompanying Signs & Symptoms:  Fever/chills: no   Flank pain no   Nausea and vomiting: no   Any vaginal symptoms: vaginal, mild itching  Abdominal/Pelvic Pain: no     History:   History of frequent UTI's: no   History of kidney stones: no   Sexually Active: YES  Possibility of pregnancy: No    Precipitating factors:   no  Therapies Tried and outcome: no         Problem list and histories reviewed & adjusted, as indicated.  Additional history: as documented    Labs reviewed in EPIC    Reviewed and updated as needed this visit by clinical staff       Reviewed and updated as needed this visit by Provider         ROS:  Constitutional, HEENT, cardiovascular, pulmonary, gi and gu systems are negative, except as otherwise noted.    OBJECTIVE:     /68   Pulse 83   Temp 97.7  F (36.5  C) (Oral)   Ht 1.626 m (5' 4\")   Wt 80.1 kg (176 lb 8 oz)   LMP 01/01/2019   SpO2 100%   BMI 30.30 kg/m    Body mass index is 30.3 kg/m .  GENERAL: healthy, alert and no distress  EYES: Eyes grossly normal to inspection  HENT: nose and mouth without ulcers or lesions      Diagnostic Test Results:  Results for orders placed or performed in visit on 02/18/19   *UA reflex to Microscopic and Culture ([place] and [facility] (except [place] and [place])   Result Value Ref Range    Color Urine Yellow     Appearance Urine Clear     Glucose Urine Negative NEG^Negative mg/dL    Bilirubin Urine Negative NEG^Negative    Ketones Urine Negative NEG^Negative mg/dL    Specific Gravity Urine 1.010 1.003 - 1.035    Blood Urine Negative " NEG^Negative    pH Urine 7.0 5.0 - 7.0 pH    Protein Albumin Urine Negative NEG^Negative mg/dL    Urobilinogen Urine 0.2 0.2 - 1.0 EU/dL    Nitrite Urine Negative NEG^Negative    Leukocyte Esterase Urine Large (A) NEG^Negative    Source Midstream Urine    Urine Microscopic   Result Value Ref Range    WBC Urine  (A) OTO5^0 - 5 /HPF    RBC Urine O - 2 OTO2^O - 2 /HPF    Squamous Epithelial /LPF Urine Few FEW^Few /LPF    Bacteria Urine Moderate (A) NEG^Negative /HPF   Wet prep   Result Value Ref Range    Specimen Description Vagina     Special Requests Vagina     Wet Prep No Trichomonas seen     Wet Prep No clue cells seen     Wet Prep No yeast seen    Urine Culture Aerobic Bacterial   Result Value Ref Range    Specimen Description Midstream Urine     Special Requests Midstream Urine     Culture Micro >100,000 colonies/mL  Escherichia coli   (A)     Culture Micro Susceptibility testing in progress        ASSESSMENT/PLAN:     1. UTI symptoms  - *UA reflex to Microscopic and Culture ([place] and [facility] (except [place] and [place])  - Urine Microscopic  - Urine Culture Aerobic Bacterial    2. Vaginal itching  - Wet prep    3. Nonspecific finding on examination of urine  - Urine Culture Aerobic Bacterial    4. Cystitis  - ciprofloxacin (CIPRO) 250 MG tablet; Take 1 tablet (250 mg) by mouth 2 times daily  Dispense: 6 tablet; Refill: 0    Deqa Paulina Warren MD  [facility]

## 2019-02-20 LAB
BACTERIA SPEC CULT: ABNORMAL
Lab: ABNORMAL
SPECIMEN SOURCE: ABNORMAL

## 2019-02-22 ENCOUNTER — ANCILLARY PROCEDURE (OUTPATIENT)
Dept: MAMMOGRAPHY | Facility: CLINIC | Age: 49
End: 2019-02-22
Attending: OBSTETRICS & GYNECOLOGY
Payer: COMMERCIAL

## 2019-02-22 DIAGNOSIS — Z12.31 VISIT FOR SCREENING MAMMOGRAM: ICD-10-CM

## 2019-04-19 ENCOUNTER — HEALTH MAINTENANCE LETTER (OUTPATIENT)
Age: 49
End: 2019-04-19

## 2019-10-14 ENCOUNTER — ALLIED HEALTH/NURSE VISIT (OUTPATIENT)
Dept: NURSING | Facility: CLINIC | Age: 49
End: 2019-10-14
Payer: COMMERCIAL

## 2019-10-14 DIAGNOSIS — Z11.1 SCREENING EXAMINATION FOR PULMONARY TUBERCULOSIS: Primary | ICD-10-CM

## 2019-10-14 PROCEDURE — 86580 TB INTRADERMAL TEST: CPT

## 2019-10-14 NOTE — NURSING NOTE

## 2019-10-16 ENCOUNTER — ALLIED HEALTH/NURSE VISIT (OUTPATIENT)
Dept: NURSING | Facility: CLINIC | Age: 49
End: 2019-10-16
Payer: COMMERCIAL

## 2019-10-16 DIAGNOSIS — Z11.1 VISIT FOR MANTOUX TEST: Primary | ICD-10-CM

## 2019-10-16 LAB
PPDINDURATION: 0 MM (ref 0–5)
PPDREDNESS: 0 MM

## 2019-10-16 PROCEDURE — 99207 ZZC NO CHARGE NURSE ONLY: CPT

## 2019-10-16 NOTE — NURSING NOTE
Patient walked to exam room for mantoux results reading.    Mantoux results: No induration.  No swelling.  No redness.    TB screening form completed and handed back to patient.  Letter provided to patient, per patient's request.    АНДРЕЙ LaneN, RN

## 2019-10-16 NOTE — LETTER
[facility]  [address] [place] [id number] [phone]          10/16/2019          To Whom it May Concern:     Morelia Stallworth, female, 1970 has had a mantoux on 10/14/2019 and a mantoux reading on 10/16/19.      Mantoux result is NEGATIVE:  Lab Results   Component Value Date    PPDREDNESS 0 10/16/2019    PPDINDURATIO 0 10/16/2019         Please contact me for questions or concerns.        Sincerely,      VICKIE Sheets RN

## 2019-10-21 ENCOUNTER — OFFICE VISIT (OUTPATIENT)
Dept: OBGYN | Facility: CLINIC | Age: 49
End: 2019-10-21
Payer: COMMERCIAL

## 2019-10-21 VITALS
HEIGHT: 64 IN | HEART RATE: 114 BPM | DIASTOLIC BLOOD PRESSURE: 72 MMHG | SYSTOLIC BLOOD PRESSURE: 127 MMHG | BODY MASS INDEX: 30.22 KG/M2 | OXYGEN SATURATION: 95 % | WEIGHT: 177 LBS

## 2019-10-21 DIAGNOSIS — Z13.220 LIPID SCREENING: ICD-10-CM

## 2019-10-21 DIAGNOSIS — Z01.419 ENCOUNTER FOR GYNECOLOGICAL EXAMINATION WITHOUT ABNORMAL FINDING: Primary | ICD-10-CM

## 2019-10-21 DIAGNOSIS — Z30.41 ENCOUNTER FOR SURVEILLANCE OF CONTRACEPTIVE PILLS: ICD-10-CM

## 2019-10-21 LAB
CHOLEST SERPL-MCNC: 225 MG/DL
HDLC SERPL-MCNC: 47 MG/DL
LDLC SERPL CALC-MCNC: 137 MG/DL
NONHDLC SERPL-MCNC: 178 MG/DL
TRIGL SERPL-MCNC: 205 MG/DL

## 2019-10-21 PROCEDURE — 99396 PREV VISIT EST AGE 40-64: CPT | Performed by: OBSTETRICS & GYNECOLOGY

## 2019-10-21 PROCEDURE — 36415 COLL VENOUS BLD VENIPUNCTURE: CPT | Performed by: OBSTETRICS & GYNECOLOGY

## 2019-10-21 PROCEDURE — 80061 LIPID PANEL: CPT | Performed by: OBSTETRICS & GYNECOLOGY

## 2019-10-21 RX ORDER — LEVONORGESTREL AND ETHINYL ESTRADIOL 0.15-0.03
1 KIT ORAL DAILY
Qty: 84 TABLET | Refills: 4 | Status: SHIPPED | OUTPATIENT
Start: 2019-10-21 | End: 2020-01-13

## 2019-10-21 ASSESSMENT — MIFFLIN-ST. JEOR: SCORE: 1427.87

## 2019-10-21 NOTE — NURSING NOTE
"Chief Complaint   Patient presents with     Physical       Initial There were no vitals taken for this visit. Estimated body mass index is 30.3 kg/m  as calculated from the following:    Height as of 19: 1.626 m (5' 4\").    Weight as of 19: 80.1 kg (176 lb 8 oz).  BP completed using cuff size: regular    Questioned patient about current smoking habits.  Pt. has never smoked.          The following HM Due: NONE      The following patient reported/Care Every where data was sent to:  P ABSTRACT QUALITY INITIATIVES [66219]  none      n/a              "

## 2019-10-21 NOTE — PROGRESS NOTES
Morelia Stallworth is a 48 year old  female who presents for annual exam.  Continues to use oral contraceptive pills, stopped them for a while and had resumption of very heavy and crampy menses so returned to use.  She takes them continuously for 3 months, then stops for a week and has a withdrawal menstrual period.  Mammogram 2019 showed scattered fibroglandular densities, she will plan annual mammogram.    Menses are every 3 months or when she stops birth control and normal lasting 4 days.  Menses flow: normal and light.  No LMP recorded.. Using oral contraceptives for contraception.  She is not currently considering pregnancy.  Besides routine health maintenance, she has no other health concerns today .  GYNECOLOGIC HISTORY:  Menarche:     Morelia is sexually active with 1male partner(s) and is currently in monogamous relationship with .    History sexually transmitted infections:No STD history  STI testing offered?  Declined  JAY JAY exposure: No  History of abnormal Pap smear: NO - age 30- 65 PAP every 3 years recommended  Family history of breast CA: No  Family history of uterine/ovarian CA: No    Family history of colon CA: No    HEALTH MAINTENANCE:  Cholesterol: (  Cholesterol   Date Value Ref Range Status   2018 213 (H) <200 mg/dL Final     Comment:     Desirable:       <200 mg/dl   2017 213 (H) <200 mg/dL Final     Comment:     Desirable:       <200 mg/dl    History of abnormal lipids: Yes  Mammo: 2019 . History of abnormal Mammo: No.  Regular Self Breast Exams: Yes  Calcium/Vitamin D intake: source:  dairy, dietary supplement(s) Adequate? Yes  TSH: (  TSH   Date Value Ref Range Status   2013 5.89 (H) 0.4 - 5.0 mU/L Final    )  Pap; (  Lab Results   Component Value Date    PAP NIL 2016    PAP NIL 2013    PAP NIL 2011    )    HISTORY:  OB History    Para Term  AB Living   2 2 2 0 0 2   SAB TAB Ectopic Multiple Live Births   0 0 0 0 2       # Outcome Date GA Lbr Hubert/2nd Weight Sex Delivery Anes PTL Lv   2 Term 05 40w0d  4.366 kg (9 lb 10 oz) F CS   LON      Name: Xena   1 Term 03 40w0d  4.082 kg (9 lb) F CS   LON      Birth Comments: failure to ognyrtr-[id number]-pgkea labor following SROM      Name: chris     Past Medical History:   Diagnosis Date     Endometriosis, site unspecified     lap      Past Surgical History:   Procedure Laterality Date     C  DELIVERY ONLY  3/4/03     C  DELIVERY ONLY  05     LAPAROSCOPY PROCEDURE UNLISTED  02    endometriosis     Family History   Problem Relation Age of Onset     Lipids Mother      Thyroid Disease Mother         hypo     Depression Mother      Lipids Father      Neurologic Disorder Father         frequent HAs     Cancer Maternal Grandfather         pancreatic     Depression Maternal Aunt      Depression Other         maternal cousins     Breast Cancer Maternal Aunt      Social History     Socioeconomic History     Marital status:      Spouse name: None     Number of children: None     Years of education: None     Highest education level: None   Occupational History     None   Social Needs     Financial resource strain: None     Food insecurity:     Worry: None     Inability: None     Transportation needs:     Medical: None     Non-medical: None   Tobacco Use     Smoking status: Never Smoker     Smokeless tobacco: Never Used   Substance and Sexual Activity     Alcohol use: Yes     Alcohol/week: 1.0 standard drinks     Types: 1 Standard drinks or equivalent per week     Comment: occ     Drug use: No     Sexual activity: Yes     Partners: Male     Birth control/protection: Pill   Lifestyle     Physical activity:     Days per week: None     Minutes per session: None     Stress: None   Relationships     Social connections:     Talks on phone: None     Gets together: None     Attends Tenriism service: None     Active member of club or organization: None     Attends  meetings of clubs or organizations: None     Relationship status: None     Intimate partner violence:     Fear of current or ex partner: None     Emotionally abused: None     Physically abused: None     Forced sexual activity: None   Other Topics Concern      Service No     Blood Transfusions No     Caffeine Concern No     Occupational Exposure No     Hobby Hazards No     Sleep Concern No     Stress Concern No     Weight Concern No     Special Diet No     Back Care No     Exercise Yes     Comment: walks daily     Bike Helmet Yes     Seat Belt Yes     Self-Exams No   Social History Narrative    Caffeine intake/servings daily - 2-3    Calcium intake/servings daily - 2    Exercise 7 times weekly - describe jogging dance    Sunscreen used - Yes    Seatbelts used - Yes    Guns stored in the home - No    Self Breast Exam - No    Pap test up to date -  Yes    Eye exam up to date -  Yes    Dental exam up to date -  Yes    DEXA scan up to date -  Not Applicable    Flex Sig/Colonoscopy up to date -  Not Applicable    Mammography up to date -  Not Applicable    Immunizations reviewed and up to date - Yes    Abuse: Current or Past (Physical, Sexual or Emotional) - No    Do you feel safe in your environment - Yes    Do you cope well with stress - Yes    Do you suffer from insomnia - occ    Last updated by: Shefali Faulkner MA September 30, 2011                                           Current Outpatient Medications:      levonorgestrel-ethinyl estradiol (NORDETTE) 0.15-30 MG-MCG tablet, Take 1 tablet by mouth daily Take hormone pills continuously x 3 months thn off one week, Disp: 84 tablet, Rfl: 0     ciprofloxacin (CIPRO) 250 MG tablet, Take 1 tablet (250 mg) by mouth 2 times daily, Disp: 6 tablet, Rfl: 0     fexofenadine (ALLEGRA) 180 MG tablet, take 1 tablet by mouth daily. (Patient not taking: Reported on 2/18/2019), Disp: 90 tablet, Rfl: 3     Allergies   Allergen Reactions     No Known Drug Allergies        Past medical,  "surgical, social and family history were reviewed and updated in EPIC.    ROS:   C:     NEGATIVE for fever, chills, change in weight  I:       NEGATIVE for worrisome rashes, moles or lesions  E:     NEGATIVE for vision changes or irritation  E/M: NEGATIVE for ear, mouth and throat problems  R:     NEGATIVE for significant cough or SOB  CV:   NEGATIVE for chest pain, palpitations or peripheral edema  GI:     NEGATIVE for nausea, abdominal pain, heartburn, or change in bowel habits  :   NEGATIVE for frequency, dysuria, hematuria, vaginal discharge, or irregular bleeding  M:     NEGATIVE for significant arthralgias or myalgia  N:      NEGATIVE for weakness, dizziness or paresthesias  E:      NEGATIVE for temperature intolerance, skin/hair changes  P:      NEGATIVE for changes in mood or affect.    EXAM:  /72   Pulse 114   Ht 1.626 m (5' 4\")   Wt 80.3 kg (177 lb)   SpO2 95%   Breastfeeding? No   BMI 30.38 kg/m     BMI: Body mass index is 30.38 kg/m .  Constitutional: healthy, alert and no distress  Head: Normocephalic. No masses, lesions, tenderness or abnormalities  Neck: Neck supple. Trachea midline. No adenopathy. Thyroid symmetric, normal size.   Cardiovascular: RRR.   Respiratory: Negative.   Breast: No nodularity, asymmetry or nipple discharge bilaterally.  Gastrointestinal: Abdomen soft, non-tender, non-distended. No masses, organomegaly.  :  Vulva:  No external lesions, normal female hair distribution, no inguinal adenopathy.    Urethra:  Midline, non-tender, well supported, no discharge  Vagina:  Moist, pink, no abnormal discharge, no lesions  Uterus:  Normal size, non-tender, freely mobile  Ovaries:  No masses appreciated, non-tender, mobile  Rectal Exam: deferred  Musculoskeletal: extremities normal  Skin: no suspicious lesions or rashes  Psychiatric: Affect appropriate, cooperative,mentation appears normal.     COUNSELING:      reports that she has never smoked. She has never used smokeless " tobacco.    Body mass index is 30.38 kg/m .  Weight management plan: diet and exercise  FRAX Risk Assessment    ASSESSMENT:  48 year old female with satisfactory annual exam  (Z01.419) Encounter for gynecological examination without abnormal finding  (primary encounter diagnosis)  Comment:   Plan:     (Z30.41) Encounter for surveillance of contraceptive pills  Comment:   Plan: levonorgestrel-ethinyl estradiol (NORDETTE)         0.15-30 MG-MCG tablet

## 2019-11-04 ENCOUNTER — HEALTH MAINTENANCE LETTER (OUTPATIENT)
Age: 49
End: 2019-11-04

## 2020-01-13 ENCOUNTER — TELEPHONE (OUTPATIENT)
Dept: OBGYN | Facility: CLINIC | Age: 50
End: 2020-01-13

## 2020-01-13 DIAGNOSIS — Z30.41 ENCOUNTER FOR SURVEILLANCE OF CONTRACEPTIVE PILLS: ICD-10-CM

## 2020-01-13 RX ORDER — LEVONORGESTREL AND ETHINYL ESTRADIOL 0.15-0.03
1 KIT ORAL DAILY
Qty: 112 TABLET | Refills: 3 | Status: SHIPPED | OUTPATIENT
Start: 2020-01-13 | End: 2020-11-23

## 2020-01-13 NOTE — TELEPHONE ENCOUNTER
Patient requesting OCP be sent to [facility] mail order pharmacy d/t insurance change. Rx sent.  Garima Morgan RN

## 2020-01-28 ENCOUNTER — VIRTUAL VISIT (OUTPATIENT)
Dept: FAMILY MEDICINE | Facility: OTHER | Age: 50
End: 2020-01-28

## 2020-01-28 NOTE — PROGRESS NOTES
"Date: 2020 06:14:03  Clinician: Raquel Barth  Clinician NPI: [id number]  Patient: Morelia Stallworth  Patient : 1970  Patient Address: [address] [place] [id number]  Patient Phone: [phone]  Visit Protocol: UTI  Patient Summary:  Morelia is a 49 year old ( : 1970 ) female who initiated a Visit for a presumed bladder infection. When asked the question \"Please sign me up to receive news, health information and promotions from [employer].\", Morelia responded \"No\".   Her symptoms started 1-3 days ago and consist of dysuria, foul-smelling urine, feeling as if the bladder is never empty, urinary frequency, and urgency.   Symptom details   Urine color: The color of her urine is yellow.    Denied symptoms include nausea, flank pain, abdominal pain, chills, vaginal discharge, urinary incontinence, vomiting, and vaginal itching. She does not feel feverish.   Morelia has not used any over-the-counter medications or home remedies to relieve her current symptoms.  Precipitating events  Morelia denies having a sexually transmitted disease.  Pertinent medical history  Morelia has had a bladder infection before but has not had any in the past 12 months. Her current symptoms are similar to her previous bladder infection symptoms.   Ciprofloxacin (Cipro) has been effective in treating her past bladder infections.   Morelia has not been prescribed antibiotics to prevent frequent or repeated bladder infections in the past and does not get yeast infections when she takes antibiotics. She has not experienced problems or side effects with any of the common antibiotics used to treat bladder infections.   Morelia does not have a history of kidney stones. She has not used a catheter or been a patient in a hospital or nursing home in the past 2 weeks.   Morelia does not smoke or use smokeless tobacco.   She denies pregnancy and denies breastfeeding. Her last period was over a month ago.     MEDICATIONS: Kurvelo (28) oral, " ALLERGIES: NKDA  Clinician Response:  Dear Morelia,  Based on the information you have provided, you likely have an acute urinary tract infection, also called a bladder infection. Bladder infections occur when bacteria from the outside of the body enters the urinary tract. Any part of the urinary system can be infected, but the bladder is the most common.  Medication information  I am prescribing:     Nitrofurantoin monohyd/m-cryst (Macrobid) 100 mg oral capsule. Take 1 capsule by mouth every 12 hours for 5 days. Take this medication with food. There are no refills with this prescription.   The medication I prescribed for your bladder infection is an antibiotic. Continue taking the medication until it is gone even if you feel better.   Yeast infections can be a common side effect of antibiotics. The most common symptom of a yeast infection is itchiness in and around the vagina. Other signs and symptoms include burning, redness, or a thick, white vaginal discharge that looks like cottage cheese and does not have a bad smell.  Self care  Urination helps to flush bacteria from the urinary tract. For this reason, drinking water and urinating often helps relieve some urinary symptoms and can decrease your risk of getting bladder infections in the future.  Other steps you can take to prevent future bladder infections include:     Wipe front to back after using the bathroom    Urinate after sexual intercourse    Avoid using deodorant sprays, douches, or powders in the vaginal area     When to seek care  Please make an appointment to be seen in a clinic or urgent care if any of the following occur:     You develop new symptoms or your symptoms become worse    You have medication side effects that make it difficult to take them as prescribed    Your symptoms do not improve within 1-2 days of starting treatment    You have symptoms of a bladder infection that return shortly after completing treatment     It is possible to have  an allergic reaction to an antibiotic even if you have not had one in the past. If you notice a new rash, significant swelling, or difficulty breathing, stop taking this medication immediately and go to a clinic or urgent care.   Diagnosis: Acute uncomplicated bladder infection  Diagnosis ICD: N39.0  Prescription: nitrofurantoin monohyd/m-cryst (Macrobid) 100 mg oral capsule 10 capsule, 5 days supply. Take 1 capsule by mouth every 12 hours for 5 days. Refills: 0, Refill as needed: no, Allow substitutions: yes  Pharmacy: [facility] - [phone] - [address] [place] [id number]

## 2020-02-16 ENCOUNTER — HEALTH MAINTENANCE LETTER (OUTPATIENT)
Age: 50
End: 2020-02-16

## 2020-08-19 DIAGNOSIS — Z12.31 VISIT FOR SCREENING MAMMOGRAM: ICD-10-CM

## 2020-08-21 ENCOUNTER — OFFICE VISIT (OUTPATIENT)
Dept: FAMILY MEDICINE | Facility: CLINIC | Age: 50
End: 2020-08-21
Payer: COMMERCIAL

## 2020-08-21 VITALS
RESPIRATION RATE: 13 BRPM | WEIGHT: 177.8 LBS | DIASTOLIC BLOOD PRESSURE: 80 MMHG | TEMPERATURE: 98.3 F | HEIGHT: 64 IN | SYSTOLIC BLOOD PRESSURE: 119 MMHG | BODY MASS INDEX: 30.35 KG/M2 | OXYGEN SATURATION: 98 % | HEART RATE: 94 BPM

## 2020-08-21 DIAGNOSIS — H91.93 HEARING DECREASED, BILATERAL: ICD-10-CM

## 2020-08-21 DIAGNOSIS — Z13.6 SCREENING FOR CARDIOVASCULAR CONDITION: ICD-10-CM

## 2020-08-21 DIAGNOSIS — E03.9 ACQUIRED HYPOTHYROIDISM: ICD-10-CM

## 2020-08-21 DIAGNOSIS — R63.5 WEIGHT GAIN: Primary | ICD-10-CM

## 2020-08-21 PROCEDURE — 80061 LIPID PANEL: CPT | Performed by: FAMILY MEDICINE

## 2020-08-21 PROCEDURE — 36415 COLL VENOUS BLD VENIPUNCTURE: CPT | Performed by: FAMILY MEDICINE

## 2020-08-21 PROCEDURE — 99214 OFFICE O/P EST MOD 30 MIN: CPT | Performed by: FAMILY MEDICINE

## 2020-08-21 PROCEDURE — 84439 ASSAY OF FREE THYROXINE: CPT | Performed by: FAMILY MEDICINE

## 2020-08-21 PROCEDURE — 84443 ASSAY THYROID STIM HORMONE: CPT | Performed by: FAMILY MEDICINE

## 2020-08-21 PROCEDURE — 92551 PURE TONE HEARING TEST AIR: CPT | Performed by: FAMILY MEDICINE

## 2020-08-21 ASSESSMENT — MIFFLIN-ST. JEOR: SCORE: 1430.47

## 2020-08-21 NOTE — PROGRESS NOTES
Subjective     Morelia Stallworth is a 49 year old female who presents to clinic today for the following health issues:    HPI     Concern - Hearing   Onset: 1 year  Description: hearing loss  Intensity: mild  Progression of Symptoms:  same  Accompanying Signs & Symptoms:  Pt state notice that she is asking people to repeat themselves   Previous history of similar problem: none  Precipitating factors:        Worsened by:  In a crowed place   Alleviating factors:        Improved by: none  Therapies tried and outcome:  none        HEARING FREQUENCY    Right Ear:      1000 Hz RESPONSE- on Level:   20 db  (Conditioning sound)   1000 Hz: RESPONSE- on Level:   20 db    2000 Hz: RESPONSE- on Level:   20 db    4000 Hz: RESPONSE- on Level:   20 db     Left Ear:      4000 Hz: RESPONSE- on Level:   20 db    2000 Hz: RESPONSE- on Level:   20 db    1000 Hz: RESPONSE- on Level:   20 db     500 Hz: RESPONSE- on Level:   20 db     Right Ear:    500 Hz: RESPONSE- on Level:   20 db     Hearing Acuity: Pass    Hearing Assessment: normal        Constantly asking people to repeat things. Going on for a month or so. Over phone is bad. Crowded area hard.   Occasionally qtips. No sinus issue.       Concern - Thyroids  Onset:  N/a nothing concerning but pt mother have a hx of thyroid problem. Well like to get test. Pt have experience weight gain.     Some fatigue.   Gained weight, constipation, resting heart rate can be low in 50s.      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     Social History     Occupational History     Not on file   Tobacco Use     Smoking status: Never Smoker     Smokeless tobacco: Never Used   Substance and Sexual Activity     Alcohol use: Yes     Alcohol/week: 1.0 standard drinks     Types: 1 Standard drinks or equivalent per week     Comment: occ     Drug use: No     Sexual activity: Yes     Partners: Male     Birth control/protection: Pill     Allergies   Allergen Reactions     No Known Drug  "Allergies      Patient Active Problem List   Diagnosis     Previous  delivery, antepartum condition or complication     CARDIOVASCULAR SCREENING; LDL GOAL LESS THAN 160     Hypothyroidism     Reviewed medications, social history and  past medical and surgical history.    Review of system: for general, respiratory, CVS, GI and psychiatry negative except for noted above.     EXAM:  /80 (BP Location: Right arm, Patient Position: Chair, Cuff Size: Adult Regular)   Pulse 94   Temp 98.3  F (36.8  C) (Oral)   Resp 13   Ht 1.632 m (5' 4.25\")   Wt 80.6 kg (177 lb 12.8 oz)   SpO2 98%   BMI 30.28 kg/m    Constitutional: healthy, alert and no distress   Psychiatric: mentation appears normal and affect normal/bright  Head: Normocephalic. No masses, lesions, tenderness or abnormalities  Both ear canals are within normal limit. No ear wax.     ASSESSMENT / PLAN:  (R63.5) Weight gain  (primary encounter diagnosis)  Comment: Her last TSH was slightly abnormal but today's TSH is significantly on higher side.  T4 is still within normal limit. we should start her on levothyroxine.  See another phone call regarding that.  Recheck in 6 weeks and adjust rx as per her response.   Plan: TSH with free T4 reflex, T4 free             (Z13.6) Screening for cardiovascular condition  Comment:    Plan: Lipid panel reflex to direct LDL Non-fasting             (H91.93) Hearing decreased, bilateral  Comment: We discussed seeing an audiologist for thorough evaluation but she would like to start with pure tone air only and it was fine. She wishes to hold off on intervention for now. Will ask for referral if needed.   Plan: SCREENING TEST, PURE TONE, AIR ONLY             (E03.9) Acquired hypothyroidism  Comment:    Plan:  New diagnosis. See above and phone call.       The above note was dictated using voice recognition. Although reviewed after completion, some word and grammatical error may remain .      "

## 2020-08-22 LAB
CHOLEST SERPL-MCNC: 239 MG/DL
HDLC SERPL-MCNC: 47 MG/DL
LDLC SERPL CALC-MCNC: 155 MG/DL
NONHDLC SERPL-MCNC: 192 MG/DL
T4 FREE SERPL-MCNC: 0.91 NG/DL (ref 0.76–1.46)
TRIGL SERPL-MCNC: 184 MG/DL
TSH SERPL DL<=0.005 MIU/L-ACNC: 16.59 MU/L (ref 0.4–4)

## 2020-08-23 DIAGNOSIS — E03.9 ACQUIRED HYPOTHYROIDISM: ICD-10-CM

## 2020-08-23 NOTE — TELEPHONE ENCOUNTER
Please call patient her TSH is abnormal.  She should be treated like hypothyroidism.  We will start with levothyroxine and she should come back in 6 weeks for blood test.  Please asked her to take her medication in the morning on empty stomach.  Avoid any food, medication, supplement for at least an hour after she takes her thyroid medication.  We need to adjust dose pending her result and it is very important that she comes back in 6 weeks for repeat labs.     Pended rx - select pharmacy and sign rx.     Her cholesterol is also abnormal and she should continue to work on her lifestyle. Improvement in thyroid can also improve cholesterol number.     She can do virtual or in person visit if she has any other question.

## 2020-08-24 ENCOUNTER — MYC MEDICAL ADVICE (OUTPATIENT)
Dept: FAMILY MEDICINE | Facility: CLINIC | Age: 50
End: 2020-08-24

## 2020-08-24 DIAGNOSIS — Z30.41 ENCOUNTER FOR SURVEILLANCE OF CONTRACEPTIVE PILLS: ICD-10-CM

## 2020-08-25 NOTE — TELEPHONE ENCOUNTER
Responded to pt on a [employer]-bites message dated 8/24/20.  Pt was asking about these lab results.    I also LM for pt to read 7-bites and call clinic triage with questions.  We need pharmacy info to send in rafat Avila RN

## 2020-08-25 NOTE — TELEPHONE ENCOUNTER
There is a result message from Dr. Wells dated 8/23/20.    I sent pt the result message on this mychart and called pt. LM to call back with questions.  We need to know pharmacy info  .CHUCK Avila

## 2020-08-26 RX ORDER — LEVOTHYROXINE SODIUM 50 UG/1
50 TABLET ORAL DAILY
Qty: 30 TABLET | Refills: 1 | Status: SHIPPED | OUTPATIENT
Start: 2020-08-26 | End: 2020-10-20

## 2020-09-20 ENCOUNTER — MYC MEDICAL ADVICE (OUTPATIENT)
Dept: FAMILY MEDICINE | Facility: CLINIC | Age: 50
End: 2020-09-20

## 2020-09-21 NOTE — TELEPHONE ENCOUNTER
Please see my response from 8/23 phone call.    PLEASE CALL PATIENT -  Do not send mychart message to avoid same conversation as patient is still not sure about the plan and need for thyroid medication.

## 2020-09-21 NOTE — TELEPHONE ENCOUNTER
"[facility] requesting call back to clinic to discuss Dr. Wells note from 8/23. Dr. Wells would like triage to speak to patient.  See note below:    \"Please call patient her TSH is abnormal.  She should be treated like hypothyroidism.  We will start with levothyroxine and she should come back in 6 weeks for blood test.  Please asked her to take her medication in the morning on empty stomach.  Avoid any food, medication, supplement for at least an hour after she takes her thyroid medication.  We need to adjust dose pending her result and it is very important that she comes back in 6 weeks for repeat labs.      Pended rx - select pharmacy and sign rx.     Her cholesterol is also abnormal and she should continue to work on her lifestyle. Improvement in thyroid can also improve cholesterol number.      She can do virtual or in person visit if she has any other question.\"           Thanks! Adriana Lamar RN    "

## 2020-10-20 DIAGNOSIS — E03.9 ACQUIRED HYPOTHYROIDISM: ICD-10-CM

## 2020-10-20 LAB
T4 FREE SERPL-MCNC: 1.3 NG/DL (ref 0.76–1.46)
TSH SERPL DL<=0.005 MIU/L-ACNC: 12.75 MU/L (ref 0.4–4)

## 2020-10-20 PROCEDURE — 84439 ASSAY OF FREE THYROXINE: CPT | Performed by: FAMILY MEDICINE

## 2020-10-20 PROCEDURE — 36415 COLL VENOUS BLD VENIPUNCTURE: CPT | Performed by: FAMILY MEDICINE

## 2020-10-20 PROCEDURE — 84443 ASSAY THYROID STIM HORMONE: CPT | Performed by: FAMILY MEDICINE

## 2020-10-20 RX ORDER — LEVOTHYROXINE SODIUM 100 UG/1
100 TABLET ORAL DAILY
Qty: 90 TABLET | Refills: 0 | Status: SHIPPED | OUTPATIENT
Start: 2020-10-20 | End: 2020-12-31

## 2020-10-20 NOTE — RESULT ENCOUNTER NOTE
Your TSH is still high means thyroid dose is still low. I increased your dose to 100mg and sent to your pharmacy.   Please start taking 2 of your current pills until you run out.   Come back in 2 months again for repeat blood test.  We will continue this process until we find stable dose for you.  Get back to me with questions.    Sánchez Wells MD  [facility]

## 2020-11-22 ENCOUNTER — HEALTH MAINTENANCE LETTER (OUTPATIENT)
Age: 50
End: 2020-11-22

## 2020-12-09 ENCOUNTER — OFFICE VISIT (OUTPATIENT)
Dept: OBGYN | Facility: CLINIC | Age: 50
End: 2020-12-09
Payer: COMMERCIAL

## 2020-12-09 VITALS
SYSTOLIC BLOOD PRESSURE: 117 MMHG | BODY MASS INDEX: 30.83 KG/M2 | HEIGHT: 64 IN | DIASTOLIC BLOOD PRESSURE: 74 MMHG | WEIGHT: 180.6 LBS | HEART RATE: 94 BPM | TEMPERATURE: 98.5 F

## 2020-12-09 DIAGNOSIS — Z01.419 ENCOUNTER FOR GYNECOLOGICAL EXAMINATION WITHOUT ABNORMAL FINDING: ICD-10-CM

## 2020-12-09 DIAGNOSIS — Z12.4 SCREENING FOR CERVICAL CANCER: Primary | ICD-10-CM

## 2020-12-09 PROCEDURE — 87624 HPV HI-RISK TYP POOLED RSLT: CPT | Performed by: OBSTETRICS & GYNECOLOGY

## 2020-12-09 PROCEDURE — G0145 SCR C/V CYTO,THINLAYER,RESCR: HCPCS | Performed by: OBSTETRICS & GYNECOLOGY

## 2020-12-09 PROCEDURE — 99396 PREV VISIT EST AGE 40-64: CPT | Performed by: OBSTETRICS & GYNECOLOGY

## 2020-12-09 ASSESSMENT — PATIENT HEALTH QUESTIONNAIRE - PHQ9
SUM OF ALL RESPONSES TO PHQ QUESTIONS 1-9: 0
5. POOR APPETITE OR OVEREATING: SEVERAL DAYS

## 2020-12-09 ASSESSMENT — ANXIETY QUESTIONNAIRES
2. NOT BEING ABLE TO STOP OR CONTROL WORRYING: NOT AT ALL
IF YOU CHECKED OFF ANY PROBLEMS ON THIS QUESTIONNAIRE, HOW DIFFICULT HAVE THESE PROBLEMS MADE IT FOR YOU TO DO YOUR WORK, TAKE CARE OF THINGS AT HOME, OR GET ALONG WITH OTHER PEOPLE: NOT DIFFICULT AT ALL
5. BEING SO RESTLESS THAT IT IS HARD TO SIT STILL: NOT AT ALL
1. FEELING NERVOUS, ANXIOUS, OR ON EDGE: SEVERAL DAYS
7. FEELING AFRAID AS IF SOMETHING AWFUL MIGHT HAPPEN: NOT AT ALL
6. BECOMING EASILY ANNOYED OR IRRITABLE: SEVERAL DAYS
GAD7 TOTAL SCORE: 4
3. WORRYING TOO MUCH ABOUT DIFFERENT THINGS: SEVERAL DAYS

## 2020-12-09 ASSESSMENT — MIFFLIN-ST. JEOR: SCORE: 1435.23

## 2020-12-09 NOTE — PROGRESS NOTES
"Chief Complaint   Patient presents with     Physical       Initial /74 (BP Location: Left arm, Patient Position: Sitting, Cuff Size: Adult Large)   Pulse 94   Temp 98.5  F (36.9  C) (Oral)   Ht 1.619 m (5' 3.75\")   Wt 81.9 kg (180 lb 9.6 oz)   LMP 2020 (Approximate)   Breastfeeding No   BMI 31.24 kg/m   Estimated body mass index is 31.24 kg/m  as calculated from the following:    Height as of this encounter: 1.619 m (5' 3.75\").    Weight as of this encounter: 81.9 kg (180 lb 9.6 oz).  BP completed using cuff size: large    Questioned patient about current smoking habits.  Pt. has never smoked.          The following HM Due: pap smear      The following patient reported/Care Every where data was sent to:  P ABSTRACT QUALITY INITIATIVES [[id number]]  n/a      patient has appointment for today and orders have been placed              Morelia Stallworth is a 49 year old  (C/S x 2) female who presents for annual exam.  Had mammogram 2020, last Pap - 2016, will send Pap and HPV test today.  The long Brynn speculum was used to visualize the cervix.  Has been diagnosed with hypothyroidism, is being treated.  Had COVID-19 (symptomatic) in October, recovering but still experiencing some aftereffects.  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      Menses are regular q 12 weeks and normal and crampy lasting 4 days.  Menses flow: light.  Patient's last menstrual period was 2020 (approximate).. Using oral contraceptives for contraception.  She is not currently considering pregnancy.  Besides routine health maintenance,  she would like to discuss having COVID in October.  GYNECOLOGIC HISTORY:  Menarche: 15  Age at first intercourse: 21 Number of lifetime partners: less than 6  Morelia Stallworth  is sexually active with male partner(s) and is currently in monogamous relationship with .    History sexually transmitted infections:No STD history  STI testing offered? "  Declined  JAY JAY exposure: No  History of abnormal Pap smear: NO - age 30- 65 PAP every 3 years recommended  Family history of breast CA: Yes (Please explain): maternal aunt  Family history of uterine/ovarian CA: No    Family history of colon CA: No    HEALTH MAINTENANCE:  Cholesterol: (  Cholesterol   Date Value Ref Range Status   2020 239 (H) <200 mg/dL Final     Comment:     Desirable:       <200 mg/dl   10/21/2019 225 (H) <200 mg/dL Final     Comment:     Desirable:       <200 mg/dl    History of abnormal lipids: Yes  Mammo: 2020 . History of abnormal Mammo: No.  Regular Self Breast Exams: No  Calcium/Vitamin D intake: source:  dairy, green leafy veggies Adequate? Yes  TSH: (  TSH   Date Value Ref Range Status   10/20/2020 12.75 (H) 0.40 - 4.00 mU/L Final    )  Pap; (  Lab Results   Component Value Date    PAP NIL 2016    PAP NIL 2013    PAP NIL 2011    )    HISTORY:  OB History    Para Term  AB Living   2 2 2 0 0 2   SAB TAB Ectopic Multiple Live Births   0 0 0 0 2      # Outcome Date GA Lbr Hubert/2nd Weight Sex Delivery Anes PTL Lv   2 Term 05 40w0d  4.366 kg (9 lb 10 oz) F CS   LON      Name: Xena   1 Term 03 40w0d  4.082 kg (9 lb) F CS   LON      Birth Comments: failure to [id number] labor following SROM      Name: chris     Past Medical History:   Diagnosis Date     Endometriosis, site unspecified     lap      Past Surgical History:   Procedure Laterality Date     C  DELIVERY ONLY  3/4/03     C  DELIVERY ONLY  05     LAPAROSCOPY PROCEDURE UNLISTED  02    endometriosis     Family History   Problem Relation Age of Onset     Lipids Mother      Thyroid Disease Mother         hypo     Depression Mother      Lipids Father      Neurologic Disorder Father         frequent HAs     Cancer Maternal Grandfather         pancreatic     Depression Maternal Aunt      Depression Other         maternal cousins     Breast Cancer  Maternal Aunt      Social History     Socioeconomic History     Marital status:      Spouse name: Not on file     Number of children: Not on file     Years of education: Not on file     Highest education level: Not on file   Occupational History     Not on file   Social Needs     Financial resource strain: Not on file     Food insecurity     Worry: Not on file     Inability: Not on file     Transportation needs     Medical: Not on file     Non-medical: Not on file   Tobacco Use     Smoking status: Never Smoker     Smokeless tobacco: Never Used   Substance and Sexual Activity     Alcohol use: Yes     Alcohol/week: 1.0 standard drinks     Types: 1 Standard drinks or equivalent per week     Comment: occ     Drug use: No     Sexual activity: Yes     Partners: Male     Birth control/protection: Pill   Lifestyle     Physical activity     Days per week: Not on file     Minutes per session: Not on file     Stress: Not on file   Relationships     Social connections     Talks on phone: Not on file     Gets together: Not on file     Attends Nondenominational service: Not on file     Active member of club or organization: Not on file     Attends meetings of clubs or organizations: Not on file     Relationship status: Not on file     Intimate partner violence     Fear of current or ex partner: Not on file     Emotionally abused: Not on file     Physically abused: Not on file     Forced sexual activity: Not on file   Other Topics Concern      Service No     Blood Transfusions No     Caffeine Concern No     Occupational Exposure No     Hobby Hazards No     Sleep Concern No     Stress Concern No     Weight Concern No     Special Diet No     Back Care No     Exercise Yes     Comment: walks daily     Bike Helmet Yes     Seat Belt Yes     Self-Exams No   Social History Narrative    Caffeine intake/servings daily - 2-3    Calcium intake/servings daily - 2    Exercise 7 times weekly - describe jogging dance    Sunscreen used -  Yes    Seatbelts used - Yes    Guns stored in the home - No    Self Breast Exam - No    Pap test up to date -  Yes    Eye exam up to date -  Yes    Dental exam up to date -  Yes    DEXA scan up to date -  Not Applicable    Flex Sig/Colonoscopy up to date -  Not Applicable    Mammography up to date -  Not Applicable    Immunizations reviewed and up to date - Yes    Abuse: Current or Past (Physical, Sexual or Emotional) - No    Do you feel safe in your environment - Yes    Do you cope well with stress - Yes    Do you suffer from insomnia - occ    Last updated by: Shefali Faulkner MA September 30, 2011                                           Current Outpatient Medications:      levonorgestrel-ethinyl estradiol (NORDETTE) 0.15-30 MG-MCG tablet, Take 1 tablet by mouth daily Take hormone pills continuously x 3 months thn off one week, Disp: 112 tablet, Rfl: 0     levothyroxine (SYNTHROID/LEVOTHROID) 100 MCG tablet, Take 1 tablet (100 mcg) by mouth daily, Disp: 90 tablet, Rfl: 0     Allergies   Allergen Reactions     No Known Drug Allergies        Past medical, surgical, social and family history were reviewed and updated in EPIC.    ROS:   C:     NEGATIVE for fever, chills, change in weight  I:       NEGATIVE for worrisome rashes, moles or lesions  E:     NEGATIVE for vision changes or irritation  E/M: NEGATIVE for ear, mouth and throat problems  R:     NEGATIVE for significant cough or SOB  CV:   NEGATIVE for chest pain, palpitations or peripheral edema  GI:     NEGATIVE for nausea, abdominal pain, heartburn, or change in bowel habits  :   NEGATIVE for frequency, dysuria, hematuria, vaginal discharge, or irregular bleeding  M:     NEGATIVE for significant arthralgias or myalgia  N:      NEGATIVE for weakness, dizziness or paresthesias  E:      NEGATIVE for temperature intolerance, skin/hair changes  P:      NEGATIVE for changes in mood or affect.    EXAM:  /74 (BP Location: Left arm, Patient Position: Sitting, Cuff  "Size: Adult Large)   Pulse 94   Temp 98.5  F (36.9  C) (Oral)   Ht 1.619 m (5' 3.75\")   Wt 81.9 kg (180 lb 9.6 oz)   LMP 11/04/2020 (Approximate)   Breastfeeding No   BMI 31.24 kg/m     BMI: Body mass index is 31.24 kg/m .  Constitutional: healthy, alert and no distress  Head: Normocephalic. No masses, lesions, tenderness or abnormalities  Neck: Neck supple. Trachea midline. No adenopathy. Thyroid symmetric, normal size.   Cardiovascular: RRR.   Respiratory: Negative.   Breast: No nodularity, asymmetry or nipple discharge bilaterally.  Gastrointestinal: Abdomen soft, non-tender, non-distended. No masses, organomegaly.  :  Vulva:  No external lesions, normal female hair distribution, no inguinal adenopathy.    Urethra:  Midline, non-tender, well supported, no discharge  Vagina:  Moist, pink, no abnormal discharge, no lesions  Uterus:  Normal size, non-tender, freely mobile  Ovaries:  No masses appreciated, non-tender, mobile  Rectal Exam: deferred  Musculoskeletal: extremities normal  Skin: no suspicious lesions or rashes  Psychiatric: Affect appropriate, cooperative,mentation appears normal.     COUNSELING:      reports that she has never smoked. She has never used smokeless tobacco.    Body mass index is 31.24 kg/m .  Weight management plan: Diet and exercise  FRAX Risk Assessment    ASSESSMENT:  49 year old female with satisfactory annual exam  (Z12.4) Screening for cervical cancer  (primary encounter diagnosis)  Comment:   Plan: HPV High Risk Types DNA Cervical, Pap imaged         thin layer screen with HPV - recommended age 30        - 65 years (select HPV order below)            (Z01.419) Encounter for gynecological examination without abnormal finding  Comment:   Plan: ]  "

## 2020-12-10 ASSESSMENT — ANXIETY QUESTIONNAIRES: GAD7 TOTAL SCORE: 4

## 2020-12-11 LAB
COPATH REPORT: NORMAL
PAP: NORMAL

## 2020-12-15 LAB
FINAL DIAGNOSIS: NORMAL
HPV HR 12 DNA CVX QL NAA+PROBE: NEGATIVE
HPV16 DNA SPEC QL NAA+PROBE: NEGATIVE
HPV18 DNA SPEC QL NAA+PROBE: NEGATIVE
SPECIMEN DESCRIPTION: NORMAL
SPECIMEN SOURCE CVX/VAG CYTO: NORMAL

## 2020-12-23 DIAGNOSIS — E03.9 ACQUIRED HYPOTHYROIDISM: ICD-10-CM

## 2020-12-23 PROCEDURE — 36415 COLL VENOUS BLD VENIPUNCTURE: CPT | Performed by: FAMILY MEDICINE

## 2020-12-23 PROCEDURE — 84443 ASSAY THYROID STIM HORMONE: CPT | Performed by: FAMILY MEDICINE

## 2020-12-24 LAB — TSH SERPL DL<=0.005 MIU/L-ACNC: 0.95 MU/L (ref 0.4–4)

## 2020-12-24 NOTE — RESULT ENCOUNTER NOTE
Shannan Ms. Stallworth,  Your results came back and are within acceptable limits. -TSH (thyroid stimulating hormone) level is normal which indicates appropriate thyroid replacement dosing.  ADVISE: continuing same replacement dose and rechecking this in 3 in case gets over corrected  months.. If you have any further concerns please do not hesitate to contact us by message, phone or making an appointment.  Have a good day   Dr Fabiano PRATER in PCp absence

## 2021-02-15 DIAGNOSIS — Z30.41 ENCOUNTER FOR SURVEILLANCE OF CONTRACEPTIVE PILLS: ICD-10-CM

## 2021-02-15 RX ORDER — LEVONORGESTREL AND ETHINYL ESTRADIOL 0.15-0.03
1 KIT ORAL DAILY
Qty: 112 TABLET | Refills: 3 | Status: SHIPPED | OUTPATIENT
Start: 2021-02-15 | End: 2021-11-09

## 2021-02-15 NOTE — TELEPHONE ENCOUNTER
Pending Prescriptions:                       Disp   Refills    levonorgestrel-ethinyl estradiol (NAA*112 ta*3            Sig: Take 1 tablet by mouth daily Take hormone pills           continuously x 3 months thn off one week    Pharmacy sent refill request. Last OV was 12/9/20. Up to date. Refill sent to pharmacy.   Leigh Johnson, CHUCK-BSN

## 2021-02-18 ENCOUNTER — OFFICE VISIT (OUTPATIENT)
Dept: FAMILY MEDICINE | Facility: CLINIC | Age: 51
End: 2021-02-18
Payer: COMMERCIAL

## 2021-02-18 ENCOUNTER — ANCILLARY PROCEDURE (OUTPATIENT)
Dept: GENERAL RADIOLOGY | Facility: CLINIC | Age: 51
End: 2021-02-18
Attending: FAMILY MEDICINE
Payer: COMMERCIAL

## 2021-02-18 VITALS
HEART RATE: 101 BPM | RESPIRATION RATE: 18 BRPM | TEMPERATURE: 98 F | SYSTOLIC BLOOD PRESSURE: 118 MMHG | OXYGEN SATURATION: 96 % | WEIGHT: 181 LBS | BODY MASS INDEX: 31.31 KG/M2 | DIASTOLIC BLOOD PRESSURE: 79 MMHG

## 2021-02-18 DIAGNOSIS — R05.3 CHRONIC COUGH: Primary | ICD-10-CM

## 2021-02-18 PROCEDURE — 99213 OFFICE O/P EST LOW 20 MIN: CPT | Performed by: FAMILY MEDICINE

## 2021-02-18 PROCEDURE — 71046 X-RAY EXAM CHEST 2 VIEWS: CPT | Performed by: RADIOLOGY

## 2021-02-18 NOTE — PROGRESS NOTES
Assessment & Plan     Chronic cough  Possibly experiencing residual symptoms from covid infection. No hx of GERD or asthma. No wheezing on exam.   -Discussed CXR to look at lungs.   -Breaking cough cycle with cough suppressant. Given it another 1-2 weeks.  -If continued symptoms consider PTFs and pulm eval  - XR Chest 2 Views  - dextromethorphan (TUSSIN COUGH) 15 MG/5ML syrup  Dispense: 118 mL; Refill: 0        26 minutes spent on the date of the encounter doing chart review, history and exam, documentation and further activities as noted above  {    DO ALLISON [facility]    Kennedy Thibodeaux is a 50 year old who presents for the following health issues:    HPI     Had confirmed covid infection in Oct., states symptoms were mild and had dry cough. Continues to have intermittent dry cough. Able to sleep and work out without difficulty. Does a lot of talking for job, making cough worse in the evening. No burning pain in chest. No history of GERD or asthma. Denies fever, chill, sore throat, nasal drainage, weight loss, nausea, vomiting, chest pain, or shortness of breath. Has not tried anything for cough.     Acute Illness  Acute illness concerns: dry cough   Onset/Duration: 5 months   Symptoms:  Fever: no  Chills/Sweats: no  Headache (location?): no  Sinus Pressure: no  Conjunctivitis:  no  Ear Pain: no  Rhinorrhea: no  Congestion: no  Sore Throat: no  Cough: YES-non-productive  Wheeze: no  Decreased Appetite: no  Nausea: no  Vomiting: no  Diarrhea: no  Dysuria/Freq.: no  Dysuria or Hematuria: no  Fatigue/Achiness: no  Sick/Strep Exposure: no  Therapies tried and outcome: None      Review of Systems   CONSTITUTIONAL: NEGATIVE for fever, chills, change in weight  INTEGUMENTARY/SKIN: NEGATIVE for worrisome rashes, moles or lesions  RESP: NEGATIVE for significant cough or SOB  CV: NEGATIVE for chest pain, palpitations or peripheral edema  GI: NEGATIVE for nausea, abdominal pain,  heartburn, or change in bowel habits  MUSCULOSKELETAL: NEGATIVE for significant arthralgias or myalgia  NEURO: NEGATIVE for weakness, dizziness or paresthesias      Objective    /79 (BP Location: Right arm, Patient Position: Sitting, Cuff Size: Adult Regular)   Pulse 101   Temp 98  F (36.7  C) (Tympanic)   Resp 18   Wt 82.1 kg (181 lb)   SpO2 96%   BMI 31.31 kg/m    Body mass index is 31.31 kg/m .  Physical Exam   GENERAL: healthy, alert and no distress  NECK: no adenopathy, no asymmetry, masses, or scars and thyroid normal to palpation  RESP: lungs clear to auscultation - no rales, rhonchi or wheezes  CV: regular rate and rhythm, normal S1 S2, no S3 or S4, no murmur, click or rub, no peripheral edema and peripheral pulses strong  ABDOMEN: soft, nontender, no hepatosplenomegaly, no masses and bowel sounds normal  MS: no gross musculoskeletal defects noted, no edema  SKIN: no suspicious lesions or rashes

## 2021-03-22 DIAGNOSIS — E03.9 ACQUIRED HYPOTHYROIDISM: ICD-10-CM

## 2021-03-22 PROCEDURE — 36415 COLL VENOUS BLD VENIPUNCTURE: CPT | Performed by: FAMILY MEDICINE

## 2021-03-22 PROCEDURE — 84443 ASSAY THYROID STIM HORMONE: CPT | Performed by: FAMILY MEDICINE

## 2021-03-22 PROCEDURE — 84439 ASSAY OF FREE THYROXINE: CPT | Performed by: FAMILY MEDICINE

## 2021-03-23 DIAGNOSIS — E03.9 ACQUIRED HYPOTHYROIDISM: ICD-10-CM

## 2021-03-23 LAB
T4 FREE SERPL-MCNC: 1.11 NG/DL (ref 0.76–1.46)
TSH SERPL DL<=0.005 MIU/L-ACNC: 4.51 MU/L (ref 0.4–4)

## 2021-03-23 RX ORDER — LEVOTHYROXINE SODIUM 100 UG/1
100 TABLET ORAL DAILY
Qty: 90 TABLET | Refills: 0 | Status: SHIPPED | OUTPATIENT
Start: 2021-03-23 | End: 2021-06-28

## 2021-05-16 ENCOUNTER — E-VISIT (OUTPATIENT)
Dept: URGENT CARE | Facility: URGENT CARE | Age: 51
End: 2021-05-16
Payer: COMMERCIAL

## 2021-05-16 DIAGNOSIS — N39.0 ACUTE UTI (URINARY TRACT INFECTION): Primary | ICD-10-CM

## 2021-05-16 PROCEDURE — 99421 OL DIG E/M SVC 5-10 MIN: CPT | Performed by: EMERGENCY MEDICINE

## 2021-05-16 RX ORDER — NITROFURANTOIN 25; 75 MG/1; MG/1
100 CAPSULE ORAL 2 TIMES DAILY
Qty: 10 CAPSULE | Refills: 0 | Status: SHIPPED | OUTPATIENT
Start: 2021-05-16 | End: 2021-05-21

## 2021-05-16 NOTE — PATIENT INSTRUCTIONS
Dear Morelia Stallworth    After reviewing your responses, I've been able to diagnose you with a urinary tract infection, which is a common infection of the bladder with bacteria.  This is not a sexually transmitted infection, though urinating immediately after intercourse can help prevent infections.  Drinking lots of fluids is also helpful to clear your current infection and prevent the next one.      I have sent a prescription for antibiotics to your pharmacy to treat this infection.    It is important that you take all of your prescribed medication even if your symptoms are improving after a few doses.  Taking all of your medicine helps prevent the symptoms from returning.     If your symptoms worsen, you develop pain in your back or stomach, develop fevers, or are not improving in 5 days, please contact your primary care provider for an appointment or visit any of our convenient Walk-in or Urgent Care Centers to be seen, which can be found on our website here.    Thanks again for choosing us as your health care partner,    Santiago Tinsley MD

## 2021-06-23 DIAGNOSIS — E03.9 ACQUIRED HYPOTHYROIDISM: ICD-10-CM

## 2021-06-25 DIAGNOSIS — E03.9 ACQUIRED HYPOTHYROIDISM: ICD-10-CM

## 2021-06-25 PROCEDURE — 36415 COLL VENOUS BLD VENIPUNCTURE: CPT | Performed by: FAMILY MEDICINE

## 2021-06-25 PROCEDURE — 84439 ASSAY OF FREE THYROXINE: CPT | Performed by: FAMILY MEDICINE

## 2021-06-25 PROCEDURE — 84443 ASSAY THYROID STIM HORMONE: CPT | Performed by: FAMILY MEDICINE

## 2021-06-26 LAB
T4 FREE SERPL-MCNC: 1.16 NG/DL (ref 0.76–1.46)
TSH SERPL DL<=0.005 MIU/L-ACNC: 6.79 MU/L (ref 0.4–4)

## 2021-06-28 RX ORDER — LEVOTHYROXINE SODIUM 100 UG/1
TABLET ORAL
Qty: 90 TABLET | Refills: 3 | OUTPATIENT
Start: 2021-06-28

## 2021-06-28 RX ORDER — LEVOTHYROXINE SODIUM 112 UG/1
112 TABLET ORAL DAILY
Qty: 90 TABLET | Refills: 0 | Status: SHIPPED | OUTPATIENT
Start: 2021-06-28 | End: 2021-09-09

## 2021-06-28 NOTE — RESULT ENCOUNTER NOTE
TSH is slowly getting worse.   We will increase thyroid medication dose to 112 mcg per day. I sent a new dose for you.   Recheck in 2 months after starting new dose. It would be a year from your last visit at that time and I can see you at that time in the clinic.    Sánchez Wells MD  [facility]

## 2021-06-28 NOTE — TELEPHONE ENCOUNTER
Message sent to pharmacy - Refusal reason: Adjustment in Therapy (ORDER SENT 6/28/21 TO [facility]. DOSE CHANGE. .  Flaco WOODS

## 2021-09-08 DIAGNOSIS — E03.9 ACQUIRED HYPOTHYROIDISM: ICD-10-CM

## 2021-09-09 RX ORDER — LEVOTHYROXINE SODIUM 112 UG/1
TABLET ORAL
Qty: 90 TABLET | Refills: 0 | Status: SHIPPED | OUTPATIENT
Start: 2021-09-09 | End: 2021-12-02

## 2021-09-17 ENCOUNTER — ANCILLARY PROCEDURE (OUTPATIENT)
Dept: MAMMOGRAPHY | Facility: CLINIC | Age: 51
End: 2021-09-17
Attending: FAMILY MEDICINE
Payer: COMMERCIAL

## 2021-09-17 DIAGNOSIS — Z12.31 VISIT FOR SCREENING MAMMOGRAM: ICD-10-CM

## 2021-09-17 PROCEDURE — 77067 SCR MAMMO BI INCL CAD: CPT | Mod: GC | Performed by: RADIOLOGY

## 2021-09-17 PROCEDURE — 77063 BREAST TOMOSYNTHESIS BI: CPT | Mod: GC | Performed by: RADIOLOGY

## 2021-09-18 ENCOUNTER — HEALTH MAINTENANCE LETTER (OUTPATIENT)
Age: 51
End: 2021-09-18

## 2021-10-05 ENCOUNTER — LAB (OUTPATIENT)
Dept: LAB | Facility: CLINIC | Age: 51
End: 2021-10-05
Payer: COMMERCIAL

## 2021-10-05 DIAGNOSIS — E03.9 ACQUIRED HYPOTHYROIDISM: ICD-10-CM

## 2021-10-05 PROCEDURE — 84443 ASSAY THYROID STIM HORMONE: CPT

## 2021-10-05 PROCEDURE — 36415 COLL VENOUS BLD VENIPUNCTURE: CPT

## 2021-10-06 LAB — TSH SERPL DL<=0.005 MIU/L-ACNC: 0.77 MU/L (ref 0.4–4)

## 2021-11-09 ENCOUNTER — OFFICE VISIT (OUTPATIENT)
Dept: FAMILY MEDICINE | Facility: CLINIC | Age: 51
End: 2021-11-09
Payer: COMMERCIAL

## 2021-11-09 VITALS
OXYGEN SATURATION: 99 % | RESPIRATION RATE: 16 BRPM | TEMPERATURE: 98.6 F | HEIGHT: 64 IN | WEIGHT: 183 LBS | SYSTOLIC BLOOD PRESSURE: 100 MMHG | DIASTOLIC BLOOD PRESSURE: 68 MMHG | HEART RATE: 84 BPM | BODY MASS INDEX: 31.24 KG/M2

## 2021-11-09 DIAGNOSIS — Z13.1 SCREENING FOR DIABETES MELLITUS: ICD-10-CM

## 2021-11-09 DIAGNOSIS — E03.9 ACQUIRED HYPOTHYROIDISM: Primary | ICD-10-CM

## 2021-11-09 DIAGNOSIS — Z13.220 SCREENING FOR HYPERLIPIDEMIA: ICD-10-CM

## 2021-11-09 PROCEDURE — 99213 OFFICE O/P EST LOW 20 MIN: CPT | Performed by: FAMILY MEDICINE

## 2021-11-09 ASSESSMENT — MIFFLIN-ST. JEOR: SCORE: 1441.11

## 2021-11-09 NOTE — PROGRESS NOTES
"  Assessment & Plan     Acquired hypothyroidism  Last TSH in therapeutic range but drop from 6.7 to 0.7 and I worry if this trend persist, she may go in iatrogenic hyperthyroidism stage.   We discussed we will recheck labs in a month and if TSH is stable - stick to same dose and OK to refill it for a year. If TSH is low - cut down weekend dose (take 1 pill daily and 1/2 pill on Sunday).     TSH   Date Value Ref Range Status   10/05/2021 0.77 0.40 - 4.00 mU/L Final   06/25/2021 6.79 (H) 0.40 - 4.00 mU/L Final   03/22/2021 4.51 (H) 0.40 - 4.00 mU/L Final   12/23/2020 0.95 0.40 - 4.00 mU/L Final   10/20/2020 12.75 (H) 0.40 - 4.00 mU/L Final   08/21/2020 16.59 (H) 0.40 - 4.00 mU/L Final     - TSH with free T4 reflex; Future    Screening for hyperlipidemia     - Lipid panel reflex to direct LDL Fasting; Future    Screening for diabetes mellitus     - Glucose; Future             BMI:   Estimated body mass index is 31.66 kg/m  as calculated from the following:    Height as of this encounter: 1.619 m (5' 3.75\").    Weight as of this encounter: 83 kg (183 lb).           No follow-ups on file.    Sánchez Wells MD, MD  [facility]    Kennedy Thibodeaux is a 50 year old who presents for the following health issues     HPI     Hypothyroidism Follow-up      Since last visit, patient describes the following symptoms: Weight stable, no hair loss, no skin changes, no constipation, no loose stools      How many servings of fruits and vegetables do you eat daily?  4 or more    On average, how many sweetened beverages do you drink each day (Examples: soda, juice, sweet tea, etc.  Do NOT count diet or artificially sweetened beverages)?   0    How many days per week do you exercise enough to make your heart beat faster? 7    How many minutes a day do you exercise enough to make your heart beat faster? 60 or more    How many days per week do you miss taking your medication? 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     Feeling fine overall.   Starting menopause. Was on mini pill for endometriosis. No mensis.          Review of Systems         Objective    /68 (BP Location: Right arm, Patient Position: Sitting, Cuff Size: Adult Regular)   Pulse 84   Temp 98.6  F (37  C) (Oral)   Resp 16   Ht 1.619 m (5' 3.75\")   Wt 83 kg (183 lb)   SpO2 99%   BMI 31.66 kg/m    Body mass index is 31.66 kg/m .  Physical Exam                     "

## 2021-12-02 DIAGNOSIS — E03.9 ACQUIRED HYPOTHYROIDISM: ICD-10-CM

## 2021-12-02 RX ORDER — LEVOTHYROXINE SODIUM 112 UG/1
112 TABLET ORAL DAILY
Qty: 90 TABLET | Refills: 1 | Status: SHIPPED | OUTPATIENT
Start: 2021-12-02 | End: 2022-06-01

## 2021-12-02 NOTE — TELEPHONE ENCOUNTER
Prescription passes protocol but noted to need labs.    Lab orders in place.    Last Written Prescription Date:  9/9/21  Last Fill Quantity: 90,  # refills: 0   Last office visit: 11/9/2021 with prescribing provider:  Priscilla Pendleton Office Visit:        Scheduling: please reach out to pt for lab appt    Yulia Monreal RN  [employer]

## 2021-12-17 ENCOUNTER — LAB (OUTPATIENT)
Dept: LAB | Facility: CLINIC | Age: 51
End: 2021-12-17
Payer: COMMERCIAL

## 2021-12-17 DIAGNOSIS — Z13.220 SCREENING FOR HYPERLIPIDEMIA: ICD-10-CM

## 2021-12-17 DIAGNOSIS — Z11.59 NEED FOR HEPATITIS C SCREENING TEST: ICD-10-CM

## 2021-12-17 DIAGNOSIS — E03.9 ACQUIRED HYPOTHYROIDISM: ICD-10-CM

## 2021-12-17 PROCEDURE — 84443 ASSAY THYROID STIM HORMONE: CPT

## 2021-12-17 PROCEDURE — 80061 LIPID PANEL: CPT

## 2021-12-17 PROCEDURE — 86803 HEPATITIS C AB TEST: CPT

## 2021-12-17 PROCEDURE — 36415 COLL VENOUS BLD VENIPUNCTURE: CPT

## 2021-12-20 ENCOUNTER — MYC MEDICAL ADVICE (OUTPATIENT)
Dept: FAMILY MEDICINE | Facility: CLINIC | Age: 51
End: 2021-12-20
Payer: COMMERCIAL

## 2021-12-20 LAB — HCV AB SERPL QL IA: NONREACTIVE

## 2021-12-21 NOTE — TELEPHONE ENCOUNTER
Dr Wells,    Lab only esau Hep C on 12/17 and pt needed TSH, lipid panel and glucose as well. Those future orders are in Epic    Per lab staff they can run the TSH and the lipid panel and they will do that now. However they cannot do a glucose on the sample, she would need to be redrawn    Dr Wells,do you want her to come back for a glucose or can it wait?    Ciarra Johnson, RN, BSN  [facility]

## 2021-12-22 LAB
CHOLEST SERPL-MCNC: 216 MG/DL
FASTING STATUS PATIENT QL REPORTED: ABNORMAL
HDLC SERPL-MCNC: 52 MG/DL
LDLC SERPL CALC-MCNC: 132 MG/DL
NONHDLC SERPL-MCNC: 164 MG/DL
TRIGL SERPL-MCNC: 159 MG/DL
TSH SERPL DL<=0.005 MIU/L-ACNC: 2.83 MU/L (ref 0.4–4)

## 2021-12-22 NOTE — TELEPHONE ENCOUNTER
Writer responded via [employer] ([id number]).    АНДРЕЙ LaneN, RN  [facility]

## 2021-12-22 NOTE — TELEPHONE ENCOUNTER
'You have not  had fasting glucose recently and it would be best to get it but if not possible for you to come - ok to wait until next year. '

## 2022-03-05 ENCOUNTER — HEALTH MAINTENANCE LETTER (OUTPATIENT)
Age: 52
End: 2022-03-05

## 2022-05-31 DIAGNOSIS — E03.9 ACQUIRED HYPOTHYROIDISM: ICD-10-CM

## 2022-06-01 RX ORDER — LEVOTHYROXINE SODIUM 112 UG/1
TABLET ORAL
Qty: 90 TABLET | Refills: 1 | Status: SHIPPED | OUTPATIENT
Start: 2022-06-01 | End: 2022-11-30

## 2022-06-01 NOTE — TELEPHONE ENCOUNTER
Thyroid Protocol Passed 05/31/2022 12:30 AM   Protocol Details  Patient is 12 years or older    Recent (12 mo) or future (30 days) visit within the authorizing provider's specialty    Medication is active on med list    Normal TSH on file in past 12 months    No active pregnancy on record    No positive pregnancy test in past 12 months

## 2022-10-16 ENCOUNTER — E-VISIT (OUTPATIENT)
Dept: FAMILY MEDICINE | Facility: CLINIC | Age: 52
End: 2022-10-16
Payer: COMMERCIAL

## 2022-10-16 DIAGNOSIS — J04.0 LARYNGITIS: Primary | ICD-10-CM

## 2022-10-16 PROCEDURE — 99421 OL DIG E/M SVC 5-10 MIN: CPT | Performed by: FAMILY MEDICINE

## 2022-10-24 ENCOUNTER — MYC MEDICAL ADVICE (OUTPATIENT)
Dept: FAMILY MEDICINE | Facility: CLINIC | Age: 52
End: 2022-10-24

## 2022-10-24 ENCOUNTER — OFFICE VISIT (OUTPATIENT)
Dept: OBGYN | Facility: CLINIC | Age: 52
End: 2022-10-24
Payer: COMMERCIAL

## 2022-10-24 VITALS
WEIGHT: 190 LBS | SYSTOLIC BLOOD PRESSURE: 114 MMHG | DIASTOLIC BLOOD PRESSURE: 70 MMHG | BODY MASS INDEX: 32.87 KG/M2 | TEMPERATURE: 99.1 F | HEART RATE: 86 BPM

## 2022-10-24 DIAGNOSIS — R73.03 PRE-DIABETES: ICD-10-CM

## 2022-10-24 DIAGNOSIS — Z12.11 COLON CANCER SCREENING: ICD-10-CM

## 2022-10-24 DIAGNOSIS — Z01.419 WELL WOMAN EXAM WITH ROUTINE GYNECOLOGICAL EXAM: Primary | ICD-10-CM

## 2022-10-24 DIAGNOSIS — Z13.220 SCREENING CHOLESTEROL LEVEL: ICD-10-CM

## 2022-10-24 DIAGNOSIS — Z13.1 SCREENING FOR DIABETES MELLITUS: ICD-10-CM

## 2022-10-24 LAB
CHOLEST SERPL-MCNC: 226 MG/DL
FASTING STATUS PATIENT QL REPORTED: YES
HBA1C MFR BLD: 5.8 % (ref 0–5.6)
HDLC SERPL-MCNC: 49 MG/DL
LDLC SERPL CALC-MCNC: 146 MG/DL
NONHDLC SERPL-MCNC: 177 MG/DL
TRIGL SERPL-MCNC: 153 MG/DL

## 2022-10-24 PROCEDURE — 36415 COLL VENOUS BLD VENIPUNCTURE: CPT | Performed by: OBSTETRICS & GYNECOLOGY

## 2022-10-24 PROCEDURE — 83036 HEMOGLOBIN GLYCOSYLATED A1C: CPT | Performed by: OBSTETRICS & GYNECOLOGY

## 2022-10-24 PROCEDURE — 99396 PREV VISIT EST AGE 40-64: CPT | Performed by: OBSTETRICS & GYNECOLOGY

## 2022-10-24 PROCEDURE — 80061 LIPID PANEL: CPT | Performed by: OBSTETRICS & GYNECOLOGY

## 2022-10-24 NOTE — PROGRESS NOTES
Morelia is a 51 year old  who presents for annual exam.     Menses are irregular and every 3-5 weeks and light bleeding lasting 3-10 days.  Occur q28-42 days  Menses flow: normal.  Patient's last menstrual period was 10/08/2022.. Using none for contraception.  She is not currently considering pregnancy.  Besides routine health maintenance,  she would like to discuss perimenopause.  She reports history of endometriosis.  Has not been taking OCP for about 2 years.  No issues with pain or heavy periods.  Denies any breast concerns, vaginal discharge, vaginal irritation, pelvic pain, nausea, vomiting, diarrhea, or constipation.   GYNECOLOGIC HISTORY:  Menarche: 15  Morelia is sexually active with 1male partner(s) and is currently in monogamous relationship .    History sexually transmitted infections:No STD history  STI testing offered?  Declined  JAY JAY exposure: No  History of abnormal Pap smear: NO - age 30- 65 PAP every 5 years recommended  Family history of breast CA: No  Family history of uterine/ovarian CA: No    Family history of colon CA: No    HEALTH MAINTENANCE:  Cholesterol: (  Cholesterol   Date Value Ref Range Status   2021 216 (H) <200 mg/dL Final   2020 239 (H) <200 mg/dL Final     Comment:     Desirable:       <200 mg/dl   10/21/2019 225 (H) <200 mg/dL Final     Comment:     Desirable:       <200 mg/dl    History of abnormal lipids: Yes  Mammo: 2021 .  Has mammogram scheduled for 10/31  History of abnormal Mammo: No.  Regular Self Breast Exams: Yes  Calcium/Vitamin D intake: source:  dairy, dietary supplement(s) Adequate? Yes  TSH: (  TSH   Date Value Ref Range Status   2021 2.83 0.40 - 4.00 mU/L Final   2021 6.79 (H) 0.40 - 4.00 mU/L Final    ) Thyroid is managed by PCP  Pap; (  Lab Results   Component Value Date    PAP NIL 2020    PAP NIL 2016    PAP NIL 2013    )2020 HPV negative    HISTORY:  OB History    Para Term  AB Living   2 2 2  0 0 2   SAB IAB Ectopic Multiple Live Births   0 0 0 0 2      # Outcome Date GA Lbr Hubert/2nd Weight Sex Delivery Anes PTL Lv   2 Term 05 40w0d  4.366 kg (9 lb 10 oz) F CS   LON      Name: Xena   1 Term 03 40w0d  4.082 kg (9 lb) F CS   LON      Birth Comments: failure to [id number] labor following SROM      Name: chris     Past Medical History:   Diagnosis Date     Endometriosis, site unspecified     lap      Past Surgical History:   Procedure Laterality Date     LAPAROSCOPY PROCEDURE UNLISTED  02    endometriosis     ZZC  DELIVERY ONLY  3/4/03     ZZC  DELIVERY ONLY  05     Family History   Problem Relation Age of Onset     Lipids Mother      Thyroid Disease Mother         hypo     Depression Mother      Lipids Father      Neurologic Disorder Father         frequent HAs     Cancer Maternal Grandfather         pancreatic     Depression Maternal Aunt      Depression Other         maternal cousins     Breast Cancer Maternal Aunt      Social History   Denies any tobacco or drug use.  Consumes 1 drink per week.  Feels she eats well.  Does not exercise as much as she used to. Has been gaining weight.       Current Outpatient Medications:      levothyroxine (SYNTHROID/LEVOTHROID) 112 MCG tablet, TAKE 1 TABLET DAILY, Disp: 90 tablet, Rfl: 1     Allergies   Allergen Reactions     No Known Drug Allergies        Past medical, surgical, social and family history were reviewed and updated in [facility].    EXAM:  /70   Pulse 86   Temp 99.1  F (37.3  C)   Wt 86.2 kg (190 lb)   LMP 10/08/2022   BMI 32.87 kg/m     BMI: Body mass index is 32.87 kg/m .  Constitutional: healthy, alert and no distress  Head: Normocephalic. No masses, lesions, tenderness or abnormalities  Neck: Neck supple. Trachea midline. No adenopathy. Thyroid symmetric, normal size.   Cardiovascular: regular rate and rhythm  Respiratory: Negative.   Breast: patient declines, has mammogram  Monday  Gastrointestinal: Abdomen soft, non-tender, non-distended. No masses, organomegaly.  : external genitalia appears normal.  Right labia majora with 2mm sebaceous cyst, left labia majora with 1mm sebaceous appearing cyst.  Non-tender, no erythema.   Musculoskeletal: extremities normal  Skin: no suspicious lesions or rashes  Psychiatric: Affect appropriate, cooperative,mentation appears normal.     COUNSELING:   Reviewed preventive health counseling, as reflected in patient instructions       Regular exercise       Healthy diet/nutrition       (Layla)menopause management   reports that she has never smoked. She has never used smokeless tobacco.    Body mass index is 32.87 kg/m .  Weight management plan: Discussed healthy diet and exercise guidelines  Offered referral to weight management clinic vs. nutrition vs. dietician and patient declines at this time.    ASSESSMENT:  51 year old female with satisfactory annual exam  (Z01.419) Well woman exam with routine gynecological exam  (primary encounter diagnosis)  COUNSELING:   Reviewed preventive health counseling, as reflected in patient instructions       Regular exercise       Healthy diet/nutrition       (Layla)menopause management   reports that she has never smoked. She has never used smokeless tobacco.  Body mass index is 32.87 kg/m .  Weight management plan: Discussed healthy diet and exercise guidelines  Offered referral to weight management clinic vs. nutrition vs. dietician and patient declines at this time    (Z13.1) Screening for diabetes mellitus  Comment: screen diabetes  Plan: Hemoglobin A1c    (Z13.220) Screening cholesterol level  Comment: history of elevated cholesterol, repeat today  Plan: Lipid Profile (Chol, Trig, HDL, LDL calc)    (Z12.11) Colon cancer screening  Comment: Patient denies any family history of colon, uterine, or ovarian cancer.  Denies any history of rectal bleeding.  Plan: COLOGUARD([employer]ACT SCIENCES)          (R73.03)  Pre-diabetes  Comment: Elevated HgbA1c  Plan: Adult Endocrinology  Referral          Lucille Santoro MD

## 2022-10-26 NOTE — TELEPHONE ENCOUNTER
Writer responded via "[employer]".    АНДРЕЙ LaneN, RN-BC  [facility]

## 2022-10-31 ENCOUNTER — ANCILLARY PROCEDURE (OUTPATIENT)
Dept: MAMMOGRAPHY | Facility: CLINIC | Age: 52
End: 2022-10-31
Attending: FAMILY MEDICINE
Payer: COMMERCIAL

## 2022-10-31 DIAGNOSIS — Z12.31 VISIT FOR SCREENING MAMMOGRAM: ICD-10-CM

## 2022-10-31 PROCEDURE — 77063 BREAST TOMOSYNTHESIS BI: CPT

## 2022-10-31 PROCEDURE — 77067 SCR MAMMO BI INCL CAD: CPT

## 2022-11-12 LAB — NONINV COLON CA DNA+OCC BLD SCRN STL QL: NEGATIVE

## 2022-11-23 ENCOUNTER — MYC MEDICAL ADVICE (OUTPATIENT)
Dept: FAMILY MEDICINE | Facility: CLINIC | Age: 52
End: 2022-11-23

## 2022-11-28 ENCOUNTER — MYC MEDICAL ADVICE (OUTPATIENT)
Dept: FAMILY MEDICINE | Facility: CLINIC | Age: 52
End: 2022-11-28

## 2022-11-28 DIAGNOSIS — Z01.84 IMMUNITY STATUS TESTING: Primary | ICD-10-CM

## 2022-11-28 DIAGNOSIS — E03.9 ACQUIRED HYPOTHYROIDISM: ICD-10-CM

## 2022-11-28 NOTE — TELEPHONE ENCOUNTER
Patient called in regarding this and other concerns, just need an order from you so she can get QB blood test. Per patient she will not have be able to come back in time frame to get mantoux read. This is all needed for hopeful upcoming employment.

## 2022-11-28 NOTE — TELEPHONE ENCOUNTER
I have signed those but she is also overdue to see me for follow up. She should schedule followup appt with me.

## 2022-11-29 NOTE — TELEPHONE ENCOUNTER
Writer responded via [employer].    АНДРЕЙ LaneN, RN-BC  [facility]

## 2022-11-30 RX ORDER — LEVOTHYROXINE SODIUM 112 UG/1
TABLET ORAL
Qty: 90 TABLET | Refills: 0 | Status: SHIPPED | OUTPATIENT
Start: 2022-11-30 | End: 2023-02-06

## 2022-11-30 NOTE — TELEPHONE ENCOUNTER
---Prescription approved per [facility] Refill Protocol.       Natalie Hayes RN BSN     [facility]        --Last visit:  11/9/2021     --Future Visit: 1/4/23 Priscilla.

## 2022-12-08 ENCOUNTER — ALLIED HEALTH/NURSE VISIT (OUTPATIENT)
Dept: FAMILY MEDICINE | Facility: CLINIC | Age: 52
End: 2022-12-08
Payer: COMMERCIAL

## 2022-12-08 ENCOUNTER — LAB (OUTPATIENT)
Dept: LAB | Facility: CLINIC | Age: 52
End: 2022-12-08
Payer: COMMERCIAL

## 2022-12-08 DIAGNOSIS — Z01.84 IMMUNITY STATUS TESTING: ICD-10-CM

## 2022-12-08 DIAGNOSIS — Z23 ENCOUNTER FOR IMMUNIZATION: Primary | ICD-10-CM

## 2022-12-08 PROCEDURE — 99207 PR NO CHARGE NURSE ONLY: CPT

## 2022-12-08 PROCEDURE — 90715 TDAP VACCINE 7 YRS/> IM: CPT

## 2022-12-08 PROCEDURE — 90471 IMMUNIZATION ADMIN: CPT

## 2022-12-08 PROCEDURE — 86787 VARICELLA-ZOSTER ANTIBODY: CPT

## 2022-12-08 PROCEDURE — 36415 COLL VENOUS BLD VENIPUNCTURE: CPT

## 2022-12-08 PROCEDURE — 86481 TB AG RESPONSE T-CELL SUSP: CPT

## 2022-12-08 NOTE — NURSING NOTE
Prior to immunization administration, verified patients identity using patient s name and date of birth. Please see Immunization Activity for additional information.     Screening Questionnaire for Adult Immunization    Are you sick today?   No   Do you have allergies to medications, food, a vaccine component or latex?   No   Have you ever had a serious reaction after receiving a vaccination?   No   Do you have a long-term health problem with heart, lung, kidney, or metabolic disease (e.g., diabetes), asthma, a blood disorder, no spleen, complement component deficiency, a cochlear implant, or a spinal fluid leak?  Are you on long-term aspirin therapy?   No   Do you have cancer, leukemia, HIV/AIDS, or any other immune system problem?   No   Do you have a parent, brother, or sister with an immune system problem?   No   In the past 3 months, have you taken medications that affect  your immune system, such as prednisone, other steroids, or anticancer drugs; drugs for the treatment of rheumatoid arthritis, Crohn s disease, or psoriasis; or have you had radiation treatments?   No   Have you had a seizure, or a brain or other nervous system problem?   No   During the past year, have you received a transfusion of blood or blood    products, or been given immune (gamma) globulin or antiviral drug?   No   For women: Are you pregnant or is there a chance you could become       pregnant during the next month?   No   Have you received any vaccinations in the past 4 weeks?   No     Immunization questionnaire answers were all negative.        Per orders of Dr. Shepard, injection of TDap (Adacel) given by Hodan Lawson MA. Patient instructed to remain in clinic for 15 minutes afterwards, and to report any adverse reaction to me immediately.       Screening performed by Hodan Lawson MA on 12/8/2022 at 11:40 AM.

## 2022-12-09 LAB
VZV IGG SER QL IA: 2746 INDEX
VZV IGG SER QL IA: POSITIVE

## 2022-12-10 LAB
QUANTIFERON MITOGEN: 10 IU/ML
QUANTIFERON NIL TUBE: 0.01 IU/ML
QUANTIFERON TB1 TUBE: 0.02 IU/ML
QUANTIFERON TB2 TUBE: 0.01

## 2022-12-11 LAB
GAMMA INTERFERON BACKGROUND BLD IA-ACNC: 0.01 IU/ML
M TB IFN-G BLD-IMP: NEGATIVE
M TB IFN-G CD4+ BCKGRND COR BLD-ACNC: 9.99 IU/ML
MITOGEN IGNF BCKGRD COR BLD-ACNC: 0 IU/ML
MITOGEN IGNF BCKGRD COR BLD-ACNC: 0.01 IU/ML

## 2023-01-11 NOTE — TELEPHONE ENCOUNTER
RECORDS RECEIVED FROM: internal    DATE RECEIVED: 1.11.23    NOTES (FOR ALL VISITS) STATUS DETAILS   OFFICE NOTES from referring provider internal  Lucille Santoro     MEDICATION LIST internal     IMAGING        XR (Chest) internal  2.18.21      LABS     DIABETES: HBGA1C, CREATININE, FASTING LIPIDS, MICROALBUMIN URINE, POTASSIUM, TSH, T4    THYROID: TSH, T4, CBC, THYRODLONULIN, TOTAL T3, FREE T4, CALCITONIN, CEA internal  Quantiferon TB Gold Plus 12/8/22  Lipid- 10.24.22   HBGA1C- 10.24.22  TSH/T4- 10.24.22

## 2023-02-06 ENCOUNTER — OFFICE VISIT (OUTPATIENT)
Dept: FAMILY MEDICINE | Facility: CLINIC | Age: 53
End: 2023-02-06
Payer: COMMERCIAL

## 2023-02-06 VITALS
SYSTOLIC BLOOD PRESSURE: 109 MMHG | WEIGHT: 188.08 LBS | HEART RATE: 82 BPM | TEMPERATURE: 97.7 F | BODY MASS INDEX: 32.11 KG/M2 | DIASTOLIC BLOOD PRESSURE: 73 MMHG | OXYGEN SATURATION: 98 % | HEIGHT: 64 IN | RESPIRATION RATE: 16 BRPM

## 2023-02-06 DIAGNOSIS — E03.9 ACQUIRED HYPOTHYROIDISM: Primary | ICD-10-CM

## 2023-02-06 DIAGNOSIS — R73.09 ELEVATED GLUCOSE: ICD-10-CM

## 2023-02-06 DIAGNOSIS — Z13.220 SCREENING FOR HYPERLIPIDEMIA: ICD-10-CM

## 2023-02-06 LAB
CHOLEST SERPL-MCNC: 220 MG/DL
HBA1C MFR BLD: 5.8 % (ref 0–5.6)
HDLC SERPL-MCNC: 50 MG/DL
LDLC SERPL CALC-MCNC: 149 MG/DL
NONHDLC SERPL-MCNC: 170 MG/DL
TRIGL SERPL-MCNC: 104 MG/DL
TSH SERPL DL<=0.005 MIU/L-ACNC: 2.03 UIU/ML (ref 0.3–4.2)

## 2023-02-06 PROCEDURE — 99214 OFFICE O/P EST MOD 30 MIN: CPT | Performed by: FAMILY MEDICINE

## 2023-02-06 PROCEDURE — 84443 ASSAY THYROID STIM HORMONE: CPT | Performed by: FAMILY MEDICINE

## 2023-02-06 PROCEDURE — 83036 HEMOGLOBIN GLYCOSYLATED A1C: CPT | Performed by: FAMILY MEDICINE

## 2023-02-06 PROCEDURE — 80061 LIPID PANEL: CPT | Performed by: FAMILY MEDICINE

## 2023-02-06 PROCEDURE — 36415 COLL VENOUS BLD VENIPUNCTURE: CPT | Performed by: FAMILY MEDICINE

## 2023-02-06 RX ORDER — LEVOTHYROXINE SODIUM 112 UG/1
112 TABLET ORAL DAILY
Qty: 90 TABLET | Refills: 3 | Status: SHIPPED | OUTPATIENT
Start: 2023-02-06 | End: 2023-03-01

## 2023-02-06 RX ORDER — LEVOTHYROXINE SODIUM 112 UG/1
112 TABLET ORAL DAILY
Qty: 90 TABLET | Refills: 0 | Status: CANCELLED | OUTPATIENT
Start: 2023-02-06

## 2023-02-06 ASSESSMENT — PAIN SCALES - GENERAL: PAINLEVEL: NO PAIN (0)

## 2023-02-06 NOTE — PROGRESS NOTES
Assessment & Plan     Acquired hypothyroidism  No major concerns. OK to fill thyroid medication pending thyroid results.  - TSH with free T4 reflex; Future  - TSH with free T4 reflex    Elevated glucose  a1c in prediabetic range. Has been referred to endocrine. Discussed weight loss, exercise, diet and carb reduction. May be early to check a1c but ok to monitor. Briefly talked about metformin and GLP1 agent for weight loss and prediabetes but right now ok to wait.   - Hemoglobin A1c; Future  - Hemoglobin A1c    Screening for hyperlipidemia  LDL somewhat on higher side. No need for meds as low ascvd score. Follow up yearly or more often as needed.   - Lipid panel reflex to direct LDL Fasting; Future  - Lipid panel reflex to direct LDL Fasting                 No follow-ups on file.    Sánchez Wells MD, MD  [facility]    Kennedy Thibodeaux is a 52 year old, presenting for the following health issues:  Thyroid Problem and Lipids (PT wants to talk to the Provider about her A1C )      History of Present Illness       Hypothyroidism:     Since last visit, patient describes the following symptoms::  None    She eats 4 or more servings of fruits and vegetables daily.She consumes 0 sweetened beverage(s) daily.She exercises with enough effort to increase her heart rate 30 to 60 minutes per day.  She exercises with enough effort to increase her heart rate 5 days per week. She is missing 1 dose(s) of medications per week.  She is not taking prescribed medications regularly due to remembering to take.     Saw OB. Prediabetic.   Siblings are also prediabetic.   Since Oct - working hard on diet. Zoomba twice per week. Packing her lunch daily.     [employer] - triage nurse.     No issues with thyroid medication. Better taking medications.     Vitamin D high dose infrequently.     Review of Systems         Objective    /73 (BP Location: Right arm, Patient Position: Sitting,  "Cuff Size: Adult Regular)   Pulse 82   Temp 97.7  F (36.5  C) (Tympanic)   Resp 16   Ht 1.619 m (5' 3.75\")   Wt 85.3 kg (188 lb 1.3 oz)   SpO2 98%   BMI 32.54 kg/m    Body mass index is 32.54 kg/m .  Physical Exam                       "

## 2023-02-27 DIAGNOSIS — E03.9 ACQUIRED HYPOTHYROIDISM: ICD-10-CM

## 2023-03-01 RX ORDER — LEVOTHYROXINE SODIUM 112 UG/1
TABLET ORAL
Qty: 90 TABLET | Refills: 3 | Status: SHIPPED | OUTPATIENT
Start: 2023-03-01 | End: 2024-01-15 | Stop reason: DRUGHIGH

## 2023-03-01 NOTE — TELEPHONE ENCOUNTER
Prescription approved per [facility] Refill Protocol.    Elaine Marquez, BSN RN  [facility]

## 2023-04-03 ENCOUNTER — PRE VISIT (OUTPATIENT)
Dept: ENDOCRINOLOGY | Facility: CLINIC | Age: 53
End: 2023-04-03

## 2023-09-25 ENCOUNTER — PATIENT OUTREACH (OUTPATIENT)
Dept: CARE COORDINATION | Facility: CLINIC | Age: 53
End: 2023-09-25
Payer: COMMERCIAL

## 2023-10-02 ENCOUNTER — PATIENT OUTREACH (OUTPATIENT)
Dept: CARE COORDINATION | Facility: CLINIC | Age: 53
End: 2023-10-02
Payer: COMMERCIAL

## 2023-10-09 ENCOUNTER — PATIENT OUTREACH (OUTPATIENT)
Dept: CARE COORDINATION | Facility: CLINIC | Age: 53
End: 2023-10-09
Payer: COMMERCIAL

## 2023-10-30 ENCOUNTER — PATIENT OUTREACH (OUTPATIENT)
Dept: CARE COORDINATION | Facility: CLINIC | Age: 53
End: 2023-10-30
Payer: COMMERCIAL

## 2023-11-25 ENCOUNTER — HEALTH MAINTENANCE LETTER (OUTPATIENT)
Age: 53
End: 2023-11-25

## 2023-12-20 ENCOUNTER — ANCILLARY PROCEDURE (OUTPATIENT)
Dept: MAMMOGRAPHY | Facility: CLINIC | Age: 53
End: 2023-12-20
Attending: FAMILY MEDICINE
Payer: COMMERCIAL

## 2023-12-20 DIAGNOSIS — Z12.31 VISIT FOR SCREENING MAMMOGRAM: ICD-10-CM

## 2023-12-20 PROCEDURE — 77063 BREAST TOMOSYNTHESIS BI: CPT | Performed by: RADIOLOGY

## 2023-12-20 PROCEDURE — 77067 SCR MAMMO BI INCL CAD: CPT | Performed by: RADIOLOGY

## 2024-01-07 ASSESSMENT — ENCOUNTER SYMPTOMS
SORE THROAT: 0
ABDOMINAL PAIN: 0
CHILLS: 0
HEMATURIA: 0
FREQUENCY: 0
MYALGIAS: 0
DIARRHEA: 0
JOINT SWELLING: 0
SHORTNESS OF BREATH: 0
PALPITATIONS: 0
PARESTHESIAS: 0
NERVOUS/ANXIOUS: 0
WEAKNESS: 0
HEMATOCHEZIA: 0
ARTHRALGIAS: 0
COUGH: 0
HEADACHES: 0
FEVER: 0
EYE PAIN: 0
HEARTBURN: 0
DIZZINESS: 0
BREAST MASS: 0
CONSTIPATION: 0
NAUSEA: 0
DYSURIA: 0

## 2024-01-10 ENCOUNTER — OFFICE VISIT (OUTPATIENT)
Dept: FAMILY MEDICINE | Facility: CLINIC | Age: 54
End: 2024-01-10
Payer: COMMERCIAL

## 2024-01-10 VITALS
SYSTOLIC BLOOD PRESSURE: 105 MMHG | HEIGHT: 65 IN | RESPIRATION RATE: 16 BRPM | DIASTOLIC BLOOD PRESSURE: 75 MMHG | WEIGHT: 186 LBS | HEART RATE: 88 BPM | OXYGEN SATURATION: 98 % | BODY MASS INDEX: 30.99 KG/M2 | TEMPERATURE: 97.3 F

## 2024-01-10 DIAGNOSIS — Z13.220 LIPID SCREENING: ICD-10-CM

## 2024-01-10 DIAGNOSIS — Z00.00 ROUTINE GENERAL MEDICAL EXAMINATION AT A HEALTH CARE FACILITY: Primary | ICD-10-CM

## 2024-01-10 DIAGNOSIS — Z12.4 CERVICAL CANCER SCREENING: ICD-10-CM

## 2024-01-10 DIAGNOSIS — E03.9 ACQUIRED HYPOTHYROIDISM: ICD-10-CM

## 2024-01-10 DIAGNOSIS — Z13.1 SCREENING FOR DIABETES MELLITUS: ICD-10-CM

## 2024-01-10 LAB
CHOLEST SERPL-MCNC: 205 MG/DL
FASTING STATUS PATIENT QL REPORTED: YES
HBA1C MFR BLD: 6.1 % (ref 0–5.6)
HDLC SERPL-MCNC: 51 MG/DL
LDLC SERPL CALC-MCNC: 136 MG/DL
NONHDLC SERPL-MCNC: 154 MG/DL
T4 FREE SERPL-MCNC: 1.65 NG/DL (ref 0.9–1.7)
TRIGL SERPL-MCNC: 91 MG/DL
TSH SERPL DL<=0.005 MIU/L-ACNC: 0.27 UIU/ML (ref 0.3–4.2)

## 2024-01-10 PROCEDURE — 84443 ASSAY THYROID STIM HORMONE: CPT | Performed by: FAMILY MEDICINE

## 2024-01-10 PROCEDURE — 83036 HEMOGLOBIN GLYCOSYLATED A1C: CPT | Performed by: FAMILY MEDICINE

## 2024-01-10 PROCEDURE — 99213 OFFICE O/P EST LOW 20 MIN: CPT | Mod: 25 | Performed by: FAMILY MEDICINE

## 2024-01-10 PROCEDURE — 36415 COLL VENOUS BLD VENIPUNCTURE: CPT | Performed by: FAMILY MEDICINE

## 2024-01-10 PROCEDURE — 84439 ASSAY OF FREE THYROXINE: CPT | Performed by: FAMILY MEDICINE

## 2024-01-10 PROCEDURE — 99396 PREV VISIT EST AGE 40-64: CPT | Performed by: FAMILY MEDICINE

## 2024-01-10 PROCEDURE — 87624 HPV HI-RISK TYP POOLED RSLT: CPT | Performed by: FAMILY MEDICINE

## 2024-01-10 PROCEDURE — 80061 LIPID PANEL: CPT | Performed by: FAMILY MEDICINE

## 2024-01-10 PROCEDURE — G0145 SCR C/V CYTO,THINLAYER,RESCR: HCPCS | Performed by: FAMILY MEDICINE

## 2024-01-10 ASSESSMENT — ENCOUNTER SYMPTOMS
CONSTIPATION: 0
HEARTBURN: 0
CHILLS: 0
SORE THROAT: 0
NERVOUS/ANXIOUS: 0
JOINT SWELLING: 0
MYALGIAS: 0
HEADACHES: 0
ABDOMINAL PAIN: 0
DIARRHEA: 0
HEMATOCHEZIA: 0
HEMATURIA: 0
NAUSEA: 0
PALPITATIONS: 0
WEAKNESS: 0
COUGH: 0
ARTHRALGIAS: 0
PARESTHESIAS: 0
BREAST MASS: 0
FREQUENCY: 0
EYE PAIN: 0
DYSURIA: 0
SHORTNESS OF BREATH: 0
FEVER: 0
DIZZINESS: 0

## 2024-01-10 NOTE — LETTER
January 23, 2024      Morelia Lambertalexus Stallworth  [address]  [place] [address]        Dear ,    We are writing to inform you of your test results.    You cholesterol levels are stable.   A1c still in prediabetes range.   TSH is a bit low, I think you may be receiving just a bit too much thyroid hormone.   I have sent in a new script through [facility] for levothyroxine 100 mcg daily.   Please schedule a lab only visit in 6 weeks to have your thyroid function rechecked on the new dosage.      Dayron Scott DO     Resulted Orders   Hemoglobin A1c   Result Value Ref Range    Hemoglobin A1C 6.1 (H) 0.0 - 5.6 %      Comment:      Normal <5.7%   Prediabetes 5.7-6.4%    Diabetes 6.5% or higher     Note: Adopted from ADA consensus guidelines.       If you have any questions or concerns, please call the clinic at the number listed above.       Sincerely,      Dayron Scott DO / [facility]

## 2024-01-10 NOTE — PROGRESS NOTES
SUBJECTIVE:   Morelia is a 53 year old, presenting for the following:  Physical        1/10/2024     7:19 AM   Additional Questions   Roomed by Sigrid   Accompanied by Self     Healthy Habits:     Getting at least 3 servings of Calcium per day:  Yes    Bi-annual eye exam:  Yes    Dental care twice a year:  Yes    Sleep apnea or symptoms of sleep apnea:  None    Diet:  Regular (no restrictions)    Frequency of exercise:  4-5 days/week    Duration of exercise:  30-45 minutes    Taking medications regularly:  Yes    Medication side effects:  None    Additional concerns today:  Yes      Today's PHQ-2 Score:       1/10/2024     7:11 AM   PHQ-2 (  [employer])   Q1: Little interest or pleasure in doing things 0   Q2: Feeling down, depressed or hopeless 0   PHQ-2 Score 0   Q1: Little interest or pleasure in doing things Not at all   Q2: Feeling down, depressed or hopeless Not at all   PHQ-2 Score 0             Have you ever done Advance Care Planning? (For example, a Health Directive, POLST, or a discussion with a medical provider or your loved ones about your wishes): No, advance care planning information given to patient to review.  Patient plans to discuss their wishes with loved ones or provider.      Social History     Tobacco Use    Smoking status: Never    Smokeless tobacco: Never   Substance Use Topics    Alcohol use: Yes     Alcohol/week: 1.0 standard drink of alcohol     Types: 1 Standard drinks or equivalent per week     Comment: occ             2024    10:20 AM   Alcohol Use   Prescreen: >3 drinks/day or >7 drinks/week? No     Reviewed orders with patient.  Reviewed health maintenance and updated orders accordingly - Yes      Breast Cancer Screenin/7/2024    10:21 AM   Breast CA Risk Assessment (FHS-7)   Do you have a family history of breast, colon, or ovarian cancer? No / Unknown         Mammogram Screening: Recommended annual mammography  Pertinent mammograms are reviewed under the imaging  "tab.    History of abnormal Pap smear: NO - age 30-65 PAP every 5 years with negative HPV co-testing recommended      Latest Ref Rng & Units 12/9/2020     3:21 PM 12/9/2020     2:30 PM 1/20/2016    12:00 AM   PAP / HPV   PAP (Historical)  NIL   NIL    HPV 16 DNA NEG^Negative  Negative     HPV 18 DNA NEG^Negative  Negative     Other HR HPV NEG^Negative  Negative       Reviewed and updated as needed this visit by clinical staff   Tobacco  Allergies  Meds              Reviewed and updated as needed this visit by Provider                     Review of Systems   Constitutional:  Negative for chills and fever.   HENT:  Negative for congestion, ear pain, hearing loss and sore throat.    Eyes:  Negative for pain and visual disturbance.   Respiratory:  Negative for cough and shortness of breath.    Cardiovascular:  Negative for chest pain, palpitations and peripheral edema.   Gastrointestinal:  Negative for abdominal pain, constipation, diarrhea, heartburn, hematochezia and nausea.   Breasts:  Negative for tenderness, breast mass and discharge.   Genitourinary:  Negative for dysuria, frequency, genital sores, hematuria, pelvic pain, urgency, vaginal bleeding and vaginal discharge.   Musculoskeletal:  Negative for arthralgias, joint swelling and myalgias.   Skin:  Negative for rash.   Neurological:  Negative for dizziness, weakness, headaches and paresthesias.   Psychiatric/Behavioral:  Negative for mood changes. The patient is not nervous/anxious.           OBJECTIVE:   /75 (BP Location: Right arm, Patient Position: Sitting, Cuff Size: Adult Large)   Pulse 88   Temp 97.3  F (36.3  C) (Temporal)   Resp 16   Ht 1.64 m (5' 4.57\")   Wt 84.4 kg (186 lb)   SpO2 98%   BMI 31.37 kg/m    Physical Exam  GENERAL: healthy, alert and no distress  EYES: Eyes grossly normal to inspection, PERRL and conjunctivae and sclerae normal  HENT:nose and mouth without ulcers or lesions  NECK: no adenopathy, no asymmetry, masses, or " "scars and thyroid normal to palpation  RESP: lungs clear to auscultation - no rales, rhonchi or wheezes  CV: regular rate and rhythm, normal S1 S2, no S3 or S4, no murmur, click or rub, no peripheral edema and peripheral pulses strong  ABDOMEN: soft, nontender, no hepatosplenomegaly, no masses and bowel sounds normal   (female): normal female external genitalia, normal urethral meatus, vaginal mucosa pink, moist, well rugated, and normal cervix  MS: no gross musculoskeletal defects noted, no edema  SKIN: no suspicious lesions or rashes  NEURO: Normal strength and tone, mentation intact and speech normal  PSYCH: mentation appears normal, affect normal/bright        ASSESSMENT/PLAN:   Morelia was seen today for physical.    Diagnoses and all orders for this visit:    Routine general medical examination at a health care facility  -     PRIMARY CARE FOLLOW-UP SCHEDULING; Future    Lipid screening  -     Lipid panel reflex to direct LDL Fasting; Future    Screening for diabetes mellitus  -     Hemoglobin A1c; Future    Cervical cancer screening  -     Pap thin layer screen with HPV - recommended age 30 - 65 years    Acquired hypothyroidism  -Due for TSH recheck will adjust levothyroxine if per thyroid function test results  -     TSH with free T4 reflex; Future              COUNSELING:  Reviewed preventive health counseling, as reflected in patient instructions       Regular exercise       Healthy diet/nutrition      BMI:   Estimated body mass index is 31.37 kg/m  as calculated from the following:    Height as of this encounter: 1.64 m (5' 4.57\").    Weight as of this encounter: 84.4 kg (186 lb).   Weight management plan: Discussed healthy diet and exercise guidelines      She reports that she has never smoked. She has never used smokeless tobacco.          DO ALLISON [facility]  "

## 2024-01-12 LAB
BKR LAB AP GYN ADEQUACY: NORMAL
BKR LAB AP GYN INTERPRETATION: NORMAL
BKR LAB AP HPV REFLEX: NORMAL
BKR LAB AP LMP: NORMAL
BKR LAB AP PREVIOUS ABNORMAL: NORMAL
PATH REPORT.COMMENTS IMP SPEC: NORMAL
PATH REPORT.COMMENTS IMP SPEC: NORMAL
PATH REPORT.RELEVANT HX SPEC: NORMAL

## 2024-01-15 ENCOUNTER — MYC MEDICAL ADVICE (OUTPATIENT)
Dept: FAMILY MEDICINE | Facility: CLINIC | Age: 54
End: 2024-01-15
Payer: COMMERCIAL

## 2024-01-15 DIAGNOSIS — E03.9 ACQUIRED HYPOTHYROIDISM: Primary | ICD-10-CM

## 2024-01-15 DIAGNOSIS — E03.9 ACQUIRED HYPOTHYROIDISM: ICD-10-CM

## 2024-01-15 LAB
HUMAN PAPILLOMA VIRUS 16 DNA: NEGATIVE
HUMAN PAPILLOMA VIRUS 18 DNA: NEGATIVE
HUMAN PAPILLOMA VIRUS FINAL DIAGNOSIS: NORMAL
HUMAN PAPILLOMA VIRUS OTHER HR: NEGATIVE

## 2024-01-15 RX ORDER — LEVOTHYROXINE SODIUM 100 UG/1
100 TABLET ORAL DAILY
Qty: 90 TABLET | Refills: 0 | Status: SHIPPED | OUTPATIENT
Start: 2024-01-15 | End: 2024-01-17

## 2024-01-15 NOTE — TELEPHONE ENCOUNTER
Patient needs prescription sent to local pharm as insurance no longer covers mail order.  Order pended for review.    Kisha Rodríguez, RN, BSN, PHN  [facility]  [phone]

## 2024-01-16 RX ORDER — LEVOTHYROXINE SODIUM 100 UG/1
100 TABLET ORAL DAILY
Qty: 90 TABLET | Refills: 0 | OUTPATIENT
Start: 2024-01-16

## 2024-01-17 RX ORDER — LEVOTHYROXINE SODIUM 100 UG/1
100 TABLET ORAL DAILY
Qty: 90 TABLET | Refills: 0 | Status: SHIPPED | OUTPATIENT
Start: 2024-01-17 | End: 2024-03-18 | Stop reason: DRUGHIGH

## 2024-01-17 NOTE — TELEPHONE ENCOUNTER
Resent levothyroxine to local pharmacy.  Writer responded via [employer].  Yulia BUTTERFIELD RN  [facility]

## 2024-03-16 ENCOUNTER — LAB (OUTPATIENT)
Dept: LAB | Facility: CLINIC | Age: 54
End: 2024-03-16
Payer: COMMERCIAL

## 2024-03-16 DIAGNOSIS — E03.9 ACQUIRED HYPOTHYROIDISM: ICD-10-CM

## 2024-03-16 PROCEDURE — 36415 COLL VENOUS BLD VENIPUNCTURE: CPT

## 2024-03-16 PROCEDURE — 84439 ASSAY OF FREE THYROXINE: CPT

## 2024-03-16 PROCEDURE — 84443 ASSAY THYROID STIM HORMONE: CPT

## 2024-03-17 LAB
T4 FREE SERPL-MCNC: 1.41 NG/DL (ref 0.9–1.7)
TSH SERPL DL<=0.005 MIU/L-ACNC: 5.38 UIU/ML (ref 0.3–4.2)

## 2024-03-18 ENCOUNTER — MYC MEDICAL ADVICE (OUTPATIENT)
Dept: FAMILY MEDICINE | Facility: CLINIC | Age: 54
End: 2024-03-18
Payer: COMMERCIAL

## 2024-03-18 DIAGNOSIS — E03.9 ACQUIRED HYPOTHYROIDISM: Primary | ICD-10-CM

## 2024-03-18 RX ORDER — LEVOTHYROXINE SODIUM 112 UG/1
112 TABLET ORAL DAILY
Qty: 90 TABLET | Refills: 2 | Status: SHIPPED | OUTPATIENT
Start: 2024-03-18 | End: 2024-03-19 | Stop reason: DRUGHIGH

## 2024-03-19 RX ORDER — LEVOTHYROXINE SODIUM 100 UG/1
100 TABLET ORAL DAILY
Qty: 90 TABLET | Refills: 1 | Status: SHIPPED | OUTPATIENT
Start: 2024-03-19 | End: 2024-09-09

## 2024-03-19 RX ORDER — LEVOTHYROXINE SODIUM 100 UG/1
100 TABLET ORAL DAILY
COMMUNITY
End: 2024-03-19

## 2024-03-19 NOTE — TELEPHONE ENCOUNTER
Dr. Scott please review and advise if you would like VV to discuss further or if agreeable to switch? Writer pended previous medication and pended new pharm-thanks!

## 2024-03-19 NOTE — TELEPHONE ENCOUNTER
Thank you for letting me know. Yes, lets keep it at 100 mcg and recheck labs in 6 weeks.    Dayron Scott, DO

## 2024-05-04 ENCOUNTER — LAB (OUTPATIENT)
Dept: LAB | Facility: CLINIC | Age: 54
End: 2024-05-04
Payer: COMMERCIAL

## 2024-05-04 DIAGNOSIS — E03.9 ACQUIRED HYPOTHYROIDISM: ICD-10-CM

## 2024-05-04 LAB
T4 FREE SERPL-MCNC: 1.38 NG/DL (ref 0.9–1.7)
TSH SERPL DL<=0.005 MIU/L-ACNC: 4.29 UIU/ML (ref 0.3–4.2)

## 2024-05-04 PROCEDURE — 36415 COLL VENOUS BLD VENIPUNCTURE: CPT

## 2024-05-04 PROCEDURE — 84443 ASSAY THYROID STIM HORMONE: CPT

## 2024-05-04 PROCEDURE — 84439 ASSAY OF FREE THYROXINE: CPT

## 2024-05-09 ENCOUNTER — MYC MEDICAL ADVICE (OUTPATIENT)
Dept: FAMILY MEDICINE | Facility: CLINIC | Age: 54
End: 2024-05-09
Payer: COMMERCIAL

## 2024-05-10 NOTE — TELEPHONE ENCOUNTER
Writer responded via [employer].  АНДРЕЙ DiazN, RN  [facility]  [phone]

## 2024-09-08 DIAGNOSIS — E03.9 ACQUIRED HYPOTHYROIDISM: ICD-10-CM

## 2024-09-09 RX ORDER — LEVOTHYROXINE SODIUM 100 UG/1
100 TABLET ORAL DAILY
Qty: 90 TABLET | Refills: 0 | Status: SHIPPED | OUTPATIENT
Start: 2024-09-09

## 2024-12-08 ENCOUNTER — MYC REFILL (OUTPATIENT)
Dept: FAMILY MEDICINE | Facility: CLINIC | Age: 54
End: 2024-12-08
Payer: COMMERCIAL

## 2024-12-08 DIAGNOSIS — E03.9 ACQUIRED HYPOTHYROIDISM: ICD-10-CM

## 2024-12-09 RX ORDER — LEVOTHYROXINE SODIUM 100 UG/1
100 TABLET ORAL DAILY
Qty: 90 TABLET | Refills: 0 | Status: SHIPPED | OUTPATIENT
Start: 2024-12-09

## 2025-03-02 ENCOUNTER — HEALTH MAINTENANCE LETTER (OUTPATIENT)
Age: 55
End: 2025-03-02